# Patient Record
Sex: FEMALE | Race: WHITE | NOT HISPANIC OR LATINO | URBAN - METROPOLITAN AREA
[De-identification: names, ages, dates, MRNs, and addresses within clinical notes are randomized per-mention and may not be internally consistent; named-entity substitution may affect disease eponyms.]

---

## 2017-09-21 ENCOUNTER — APPOINTMENT (OUTPATIENT)
Dept: URBAN - METROPOLITAN AREA CLINIC 200 | Age: 57
Setting detail: DERMATOLOGY
End: 2017-09-25

## 2017-09-21 DIAGNOSIS — L57.8 OTHER SKIN CHANGES DUE TO CHRONIC EXPOSURE TO NONIONIZING RADIATION: ICD-10-CM

## 2017-09-21 DIAGNOSIS — L98.8 OTHER SPECIFIED DISORDERS OF THE SKIN AND SUBCUTANEOUS TISSUE: ICD-10-CM

## 2017-09-21 PROBLEM — L90.8 OTHER ATROPHIC DISORDERS OF SKIN: Status: ACTIVE | Noted: 2017-09-21

## 2017-09-21 PROCEDURE — OTHER COUNSELING: OTHER

## 2017-09-21 PROCEDURE — 99213 OFFICE O/P EST LOW 20 MIN: CPT

## 2017-09-21 PROCEDURE — OTHER BOTOX (U OR CC) ADDITIVE: OTHER

## 2017-09-21 PROCEDURE — OTHER BOTOX (U OR CC): OTHER

## 2017-09-21 ASSESSMENT — LOCATION ZONE DERM
LOCATION ZONE: TRUNK
LOCATION ZONE: FACE
LOCATION ZONE: FACE

## 2017-09-21 ASSESSMENT — LOCATION DETAILED DESCRIPTION DERM
LOCATION DETAILED: GLABELLA
LOCATION DETAILED: RIGHT INFERIOR MEDIAL FOREHEAD
LOCATION DETAILED: INFERIOR THORACIC SPINE
LOCATION DETAILED: LEFT FOREHEAD
LOCATION DETAILED: RIGHT INFERIOR FOREHEAD
LOCATION DETAILED: LEFT INFERIOR MEDIAL FOREHEAD

## 2017-09-21 ASSESSMENT — LOCATION SIMPLE DESCRIPTION DERM
LOCATION SIMPLE: RIGHT FOREHEAD
LOCATION SIMPLE: LEFT FOREHEAD
LOCATION SIMPLE: GLABELLA
LOCATION SIMPLE: LEFT FOREHEAD
LOCATION SIMPLE: UPPER BACK

## 2017-12-15 ENCOUNTER — APPOINTMENT (OUTPATIENT)
Dept: URBAN - METROPOLITAN AREA CLINIC 200 | Age: 57
Setting detail: DERMATOLOGY
End: 2017-12-18

## 2017-12-15 DIAGNOSIS — L98.8 OTHER SPECIFIED DISORDERS OF THE SKIN AND SUBCUTANEOUS TISSUE: ICD-10-CM

## 2017-12-15 PROBLEM — L90.8 OTHER ATROPHIC DISORDERS OF SKIN: Status: ACTIVE | Noted: 2017-12-15

## 2017-12-15 PROCEDURE — OTHER BOTOX (U OR CC) ADDITIVE: OTHER

## 2017-12-15 PROCEDURE — OTHER BOTOX (U OR CC): OTHER

## 2017-12-15 ASSESSMENT — LOCATION DETAILED DESCRIPTION DERM
LOCATION DETAILED: GLABELLA
LOCATION DETAILED: LEFT FOREHEAD
LOCATION DETAILED: LEFT INFERIOR MEDIAL FOREHEAD
LOCATION DETAILED: RIGHT INFERIOR FOREHEAD
LOCATION DETAILED: RIGHT INFERIOR MEDIAL FOREHEAD

## 2017-12-15 ASSESSMENT — LOCATION ZONE DERM
LOCATION ZONE: FACE
LOCATION ZONE: FACE

## 2017-12-15 ASSESSMENT — LOCATION SIMPLE DESCRIPTION DERM
LOCATION SIMPLE: RIGHT FOREHEAD
LOCATION SIMPLE: LEFT FOREHEAD
LOCATION SIMPLE: GLABELLA
LOCATION SIMPLE: LEFT FOREHEAD

## 2018-03-16 ENCOUNTER — RX ONLY (RX ONLY)
Age: 58
End: 2018-03-16

## 2018-03-16 ENCOUNTER — APPOINTMENT (OUTPATIENT)
Dept: URBAN - METROPOLITAN AREA CLINIC 200 | Age: 58
Setting detail: DERMATOLOGY
End: 2018-03-28

## 2018-03-16 DIAGNOSIS — L98.8 OTHER SPECIFIED DISORDERS OF THE SKIN AND SUBCUTANEOUS TISSUE: ICD-10-CM

## 2018-03-16 PROCEDURE — OTHER BOTOX (U OR CC) ADDITIVE: OTHER

## 2018-03-16 PROCEDURE — OTHER BOTOX (U OR CC): OTHER

## 2018-03-16 RX ORDER — BIMATOPROST 0.3 MG/ML
SOLUTION/ DROPS OPHTHALMIC
Qty: 1 | Refills: 3

## 2018-03-16 ASSESSMENT — LOCATION DETAILED DESCRIPTION DERM
LOCATION DETAILED: LEFT INFERIOR FOREHEAD
LOCATION DETAILED: SUPERIOR MID FOREHEAD
LOCATION DETAILED: LEFT MEDIAL FOREHEAD
LOCATION DETAILED: RIGHT INFERIOR MEDIAL FOREHEAD
LOCATION DETAILED: LEFT FOREHEAD
LOCATION DETAILED: LEFT FOREHEAD
LOCATION DETAILED: RIGHT FOREHEAD
LOCATION DETAILED: GLABELLA

## 2018-03-16 ASSESSMENT — LOCATION SIMPLE DESCRIPTION DERM
LOCATION SIMPLE: LEFT FOREHEAD
LOCATION SIMPLE: SUPERIOR FOREHEAD
LOCATION SIMPLE: LEFT FOREHEAD
LOCATION SIMPLE: RIGHT FOREHEAD
LOCATION SIMPLE: GLABELLA

## 2018-03-16 ASSESSMENT — LOCATION ZONE DERM
LOCATION ZONE: FACE
LOCATION ZONE: FACE

## 2018-06-15 ENCOUNTER — APPOINTMENT (OUTPATIENT)
Dept: URBAN - METROPOLITAN AREA CLINIC 200 | Age: 58
Setting detail: DERMATOLOGY
End: 2018-06-15

## 2018-06-15 DIAGNOSIS — L98.8 OTHER SPECIFIED DISORDERS OF THE SKIN AND SUBCUTANEOUS TISSUE: ICD-10-CM

## 2018-06-15 PROBLEM — L57.0 ACTINIC KERATOSIS: Status: ACTIVE | Noted: 2018-06-15

## 2018-06-15 PROCEDURE — OTHER BOTOX (U OR CC) ADDITIVE: OTHER

## 2018-06-15 PROCEDURE — OTHER BOTOX (U OR CC): OTHER

## 2018-06-15 ASSESSMENT — LOCATION SIMPLE DESCRIPTION DERM
LOCATION SIMPLE: LEFT FOREHEAD
LOCATION SIMPLE: GLABELLA
LOCATION SIMPLE: RIGHT FOREHEAD
LOCATION SIMPLE: SUPERIOR FOREHEAD
LOCATION SIMPLE: LEFT FOREHEAD

## 2018-06-15 ASSESSMENT — LOCATION DETAILED DESCRIPTION DERM
LOCATION DETAILED: LEFT FOREHEAD
LOCATION DETAILED: LEFT MEDIAL FOREHEAD
LOCATION DETAILED: LEFT INFERIOR FOREHEAD
LOCATION DETAILED: RIGHT FOREHEAD
LOCATION DETAILED: LEFT FOREHEAD
LOCATION DETAILED: RIGHT INFERIOR MEDIAL FOREHEAD
LOCATION DETAILED: GLABELLA
LOCATION DETAILED: SUPERIOR MID FOREHEAD

## 2018-06-15 ASSESSMENT — LOCATION ZONE DERM
LOCATION ZONE: FACE
LOCATION ZONE: FACE

## 2018-09-07 ENCOUNTER — APPOINTMENT (OUTPATIENT)
Dept: URBAN - METROPOLITAN AREA CLINIC 200 | Age: 58
Setting detail: DERMATOLOGY
End: 2018-09-11

## 2018-09-07 DIAGNOSIS — L72.0 EPIDERMAL CYST: ICD-10-CM

## 2018-09-07 DIAGNOSIS — L98.8 OTHER SPECIFIED DISORDERS OF THE SKIN AND SUBCUTANEOUS TISSUE: ICD-10-CM

## 2018-09-07 DIAGNOSIS — L57.8 OTHER SKIN CHANGES DUE TO CHRONIC EXPOSURE TO NONIONIZING RADIATION: ICD-10-CM

## 2018-09-07 DIAGNOSIS — L82.1 OTHER SEBORRHEIC KERATOSIS: ICD-10-CM

## 2018-09-07 PROBLEM — L55.1 SUNBURN OF SECOND DEGREE: Status: ACTIVE | Noted: 2018-09-07

## 2018-09-07 PROCEDURE — OTHER BENIGN DESTRUCTION COSMETIC: OTHER

## 2018-09-07 PROCEDURE — OTHER BOTOX (U OR CC): OTHER

## 2018-09-07 PROCEDURE — OTHER COUNSELING: OTHER

## 2018-09-07 PROCEDURE — OTHER BOTOX (U OR CC) ADDITIVE: OTHER

## 2018-09-07 PROCEDURE — OTHER LIQUID NITROGEN (COSMETIC): OTHER

## 2018-09-07 PROCEDURE — 99213 OFFICE O/P EST LOW 20 MIN: CPT

## 2018-09-07 ASSESSMENT — LOCATION ZONE DERM
LOCATION ZONE: FACE
LOCATION ZONE: FACE
LOCATION ZONE: TRUNK
LOCATION ZONE: TRUNK

## 2018-09-07 ASSESSMENT — LOCATION DETAILED DESCRIPTION DERM
LOCATION DETAILED: LEFT FOREHEAD
LOCATION DETAILED: SUBXIPHOID
LOCATION DETAILED: LEFT MEDIAL SUPERIOR CHEST
LOCATION DETAILED: RIGHT INFERIOR MEDIAL FOREHEAD
LOCATION DETAILED: LEFT MEDIAL FOREHEAD
LOCATION DETAILED: SUPERIOR MID FOREHEAD
LOCATION DETAILED: LEFT FOREHEAD
LOCATION DETAILED: GLABELLA
LOCATION DETAILED: LEFT SUPERIOR LATERAL FOREHEAD
LOCATION DETAILED: UPPER STERNUM
LOCATION DETAILED: RIGHT SUPERIOR FOREHEAD
LOCATION DETAILED: RIGHT FOREHEAD
LOCATION DETAILED: LEFT INFERIOR FOREHEAD

## 2018-09-07 ASSESSMENT — LOCATION SIMPLE DESCRIPTION DERM
LOCATION SIMPLE: SUPERIOR FOREHEAD
LOCATION SIMPLE: LEFT FOREHEAD
LOCATION SIMPLE: CHEST
LOCATION SIMPLE: ABDOMEN
LOCATION SIMPLE: CHEST
LOCATION SIMPLE: LEFT FOREHEAD
LOCATION SIMPLE: RIGHT FOREHEAD
LOCATION SIMPLE: GLABELLA

## 2018-09-07 NOTE — PROCEDURE: LIQUID NITROGEN (COSMETIC)
Detail Level: Generalized
Render Post-Care Instructions In Note?: no
Post-Care Instructions: I reviewed with the patient in detail post-care instructions. Patient is to wear sunprotection, and avoid picking at any of the treated lesions. Pt may apply Vaseline to crusted or scabbing areas.
Price (Use Numbers Only, No Special Characters Or $): 0
Consent: The patient's consent was obtained including but not limited to risks of crusting, scabbing, blistering, scarring, darker or lighter pigmentary change, recurrence, incomplete removal and infection. The patient understands that the procedure is cosmetic in nature and is not covered by insurance.

## 2018-09-07 NOTE — PROCEDURE: BENIGN DESTRUCTION COSMETIC
Detail Level: Zone
Anesthesia Type: 2% lidocaine without epinephrine
Consent: The patient's consent was obtained including but not limited to risks of crusting, scabbing, blistering, scarring, darker or lighter pigmentary change, recurrence, incomplete removal and infection.
Price (Use Numbers Only, No Special Characters Or $): 0
Post-Care Instructions: I reviewed with the patient in detail post-care instructions. Patient is to wear sunprotection, and avoid picking at any of the treated lesions. Pt may apply Vaseline to crusted or scabbing areas.

## 2018-09-20 ENCOUNTER — RX ONLY (RX ONLY)
Age: 58
End: 2018-09-20

## 2018-09-20 RX ORDER — BIMATOPROST 0.3 MG/ML
SOLUTION/ DROPS OPHTHALMIC
Qty: 2 | Refills: 3 | Status: ERX

## 2018-11-12 ENCOUNTER — HOSPITAL ENCOUNTER (OUTPATIENT)
Facility: CLINIC | Age: 58
Discharge: HOME | End: 2018-11-12
Attending: INTERNAL MEDICINE
Payer: COMMERCIAL

## 2018-11-12 VITALS
DIASTOLIC BLOOD PRESSURE: 78 MMHG | HEIGHT: 65 IN | RESPIRATION RATE: 16 BRPM | SYSTOLIC BLOOD PRESSURE: 136 MMHG | WEIGHT: 160 LBS | HEART RATE: 71 BPM | TEMPERATURE: 97.8 F | BODY MASS INDEX: 26.66 KG/M2 | OXYGEN SATURATION: 94 %
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
## 2018-11-12 PROCEDURE — S9083 URGENT CARE CENTER GLOBAL: HCPCS | Performed by: INTERNAL MEDICINE

## 2018-11-12 PROCEDURE — 99202 OFFICE O/P NEW SF 15 MIN: CPT | Performed by: INTERNAL MEDICINE

## 2018-11-12 RX ORDER — PREDNISONE 20 MG/1
TABLET ORAL
Refills: 0 | COMMUNITY
Start: 2018-10-29 | End: 2019-02-04 | Stop reason: ALTCHOICE

## 2018-11-12 RX ORDER — HYDROCHLOROTHIAZIDE 12.5 MG/1
12.5 CAPSULE ORAL
Refills: 1 | COMMUNITY
Start: 2018-08-08 | End: 2019-02-04 | Stop reason: ALTCHOICE

## 2018-11-12 RX ORDER — BIMATOPROST 3 UG/ML
SOLUTION TOPICAL
Refills: 3 | COMMUNITY
Start: 2018-10-06

## 2018-11-12 RX ORDER — AMOXICILLIN AND CLAVULANATE POTASSIUM 875; 125 MG/1; MG/1
1 TABLET, FILM COATED ORAL 2 TIMES DAILY
Qty: 16 TABLET | Refills: 0 | Status: SHIPPED | OUTPATIENT
Start: 2018-11-12 | End: 2019-02-04 | Stop reason: ALTCHOICE

## 2018-11-12 RX ORDER — ESCITALOPRAM OXALATE 5 MG/1
TABLET ORAL
Refills: 1 | COMMUNITY
Start: 2018-10-29 | End: 2019-05-22 | Stop reason: SDUPTHER

## 2018-11-12 RX ORDER — LISINOPRIL AND HYDROCHLOROTHIAZIDE 10; 12.5 MG/1; MG/1
1 TABLET ORAL
Refills: 1 | COMMUNITY
Start: 2018-10-29 | End: 2019-02-04 | Stop reason: DRUGHIGH

## 2018-11-12 ASSESSMENT — ENCOUNTER SYMPTOMS
TROUBLE SWALLOWING: 0
VOMITING: 0
WOUND: 1
SHORTNESS OF BREATH: 0
DIZZINESS: 0
FEVER: 0
NAUSEA: 0

## 2018-11-12 NOTE — ED PROVIDER NOTES
History  No chief complaint on file.    Bitten by neighbor's dog  Dog UTD per patient    Allergies; sulfa        History provided by:  Patient   used: No    Animal Bite   Contact animal:  Dog  Location:  Leg  Leg injury location:  L lower leg  Time since incident:  30 minutes  Pain details:     Quality:  Aching    Severity:  Moderate  Provoked: unprovoked    Notifications:  None  Animal in possession: yes    Tetanus status:  Up to date  Relieved by:  None tried  Associated symptoms: swelling    Associated symptoms: no fever        No past medical history on file.    No past surgical history on file.    No family history on file.    Social History   Substance Use Topics   • Smoking status: Not on file   • Smokeless tobacco: Not on file   • Alcohol use Not on file       Review of Systems   Constitutional: Negative for fever.   HENT: Negative for trouble swallowing.    Respiratory: Negative for shortness of breath.    Cardiovascular: Negative for chest pain.   Gastrointestinal: Negative for nausea and vomiting.   Skin: Positive for wound.   Neurological: Negative for dizziness.       Physical Exam  ED Triage Vitals   Temp Pulse Resp BP SpO2   -- -- -- -- --      Temp src Heart Rate Source Patient Position BP Location FiO2 (%) (Set)   -- -- -- -- --       Physical Exam   Constitutional: She appears well-nourished. No distress.   Cardiovascular: Normal rate and regular rhythm.    Pulmonary/Chest: Effort normal and breath sounds normal. No respiratory distress.   Skin: Skin is warm and dry.   There is a 1 cm laceration over the lateral aspect of the left thigh.  The area is tender to palpation.  No induration or fluctuance.  Bleeding is controlled.  There is some surrounding superficial erythema.  Additionally there is some appearance of some early bruising inferior and lateral to the laceration/bite wound.   Psychiatric: She has a normal mood and affect. Her behavior is normal.          Procedures  Procedures    UC Course  Clinical Impressions as of Nov 12 1633   Dog bite, initial encounter       MDM  Number of Diagnoses or Management Options  Dog bite, initial encounter:   Diagnosis management comments: Patient presents with a painful dog bite to the left thigh.  Happened just a little while ago.  The area is somewhat red around the wound.  Patient will be started with antibiotics in the form of Augmentin.  She has a sulfa allergy.  The dog is up-to-date on its rabies vaccine and the patient is up-to-date on her tetanus.  Anti-inflammatories as needed.  Local wound care was provided in the office with a dressing.                 Dileep Alston DO  11/12/18 [id number]

## 2018-11-12 NOTE — DISCHARGE INSTRUCTIONS
In the office the wound was irrigated with approximately 200 mL's of our wound care spray.  You did great.  The wound was covered with Telfa and a clean dressing.  I would not use any antibiotic salves or creams to the area which can keep it covered.  It may drain a little bit today from all the fluid we irrigated into the wound.  Additionally Motrin or Aleve, not both, for pain as needed and directed.  Please start the antibiotics and take as directed twice daily.  If the area gets worse especially after 24-48 hours you start developing high fever worsening swelling or other concerns please go to the nearest ER for further evaluation and treatment.

## 2018-12-07 ENCOUNTER — APPOINTMENT (OUTPATIENT)
Dept: URBAN - METROPOLITAN AREA CLINIC 200 | Age: 58
Setting detail: DERMATOLOGY
End: 2018-12-17

## 2018-12-07 DIAGNOSIS — L57.8 OTHER SKIN CHANGES DUE TO CHRONIC EXPOSURE TO NONIONIZING RADIATION: ICD-10-CM

## 2018-12-07 DIAGNOSIS — L98.8 OTHER SPECIFIED DISORDERS OF THE SKIN AND SUBCUTANEOUS TISSUE: ICD-10-CM

## 2018-12-07 PROBLEM — L55.1 SUNBURN OF SECOND DEGREE: Status: ACTIVE | Noted: 2018-12-07

## 2018-12-07 PROCEDURE — OTHER BOTOX (U OR CC) ADDITIVE: OTHER

## 2018-12-07 PROCEDURE — OTHER BOTOX (U OR CC): OTHER

## 2018-12-07 ASSESSMENT — LOCATION DETAILED DESCRIPTION DERM
LOCATION DETAILED: RIGHT INFERIOR MEDIAL FOREHEAD
LOCATION DETAILED: LEFT MEDIAL FOREHEAD
LOCATION DETAILED: GLABELLA
LOCATION DETAILED: LEFT SUPERIOR LATERAL FOREHEAD
LOCATION DETAILED: SUPERIOR MID FOREHEAD
LOCATION DETAILED: RIGHT FOREHEAD
LOCATION DETAILED: RIGHT SUPERIOR FOREHEAD
LOCATION DETAILED: LEFT FOREHEAD
LOCATION DETAILED: LEFT INFERIOR FOREHEAD

## 2018-12-07 ASSESSMENT — LOCATION ZONE DERM: LOCATION ZONE: FACE

## 2018-12-07 ASSESSMENT — LOCATION SIMPLE DESCRIPTION DERM
LOCATION SIMPLE: RIGHT FOREHEAD
LOCATION SIMPLE: SUPERIOR FOREHEAD
LOCATION SIMPLE: GLABELLA
LOCATION SIMPLE: LEFT FOREHEAD

## 2019-02-04 ENCOUNTER — OFFICE VISIT (OUTPATIENT)
Dept: PRIMARY CARE | Facility: CLINIC | Age: 59
End: 2019-02-04
Payer: COMMERCIAL

## 2019-02-04 VITALS
SYSTOLIC BLOOD PRESSURE: 168 MMHG | OXYGEN SATURATION: 98 % | DIASTOLIC BLOOD PRESSURE: 97 MMHG | BODY MASS INDEX: 26.82 KG/M2 | RESPIRATION RATE: 14 BRPM | HEART RATE: 71 BPM | TEMPERATURE: 98.5 F | WEIGHT: 161 LBS | HEIGHT: 65 IN

## 2019-02-04 DIAGNOSIS — J01.00 ACUTE NON-RECURRENT MAXILLARY SINUSITIS: ICD-10-CM

## 2019-02-04 DIAGNOSIS — I10 ESSENTIAL HYPERTENSION: Primary | ICD-10-CM

## 2019-02-04 PROBLEM — F33.41 RECURRENT MAJOR DEPRESSIVE DISORDER, IN PARTIAL REMISSION (CMS/HCC): Chronic | Status: ACTIVE | Noted: 2019-02-04

## 2019-02-04 PROBLEM — F33.41 RECURRENT MAJOR DEPRESSIVE DISORDER, IN PARTIAL REMISSION (CMS/HCC): Status: ACTIVE | Noted: 2019-02-04

## 2019-02-04 PROCEDURE — 99214 OFFICE O/P EST MOD 30 MIN: CPT | Performed by: FAMILY MEDICINE

## 2019-02-04 RX ORDER — CETIRIZINE HYDROCHLORIDE 10 MG/1
10 TABLET ORAL DAILY
COMMUNITY
End: 2021-12-13 | Stop reason: ALTCHOICE

## 2019-02-04 RX ORDER — VALACYCLOVIR HYDROCHLORIDE 1 G/1
TABLET, FILM COATED ORAL
Refills: 0 | COMMUNITY
Start: 2018-11-28 | End: 2019-09-05 | Stop reason: ALTCHOICE

## 2019-02-04 RX ORDER — LISINOPRIL AND HYDROCHLOROTHIAZIDE 20; 25 MG/1; MG/1
1 TABLET ORAL DAILY
Qty: 90 TABLET | Refills: 1 | COMMUNITY
Start: 2019-02-04 | End: 2019-03-18 | Stop reason: SDUPTHER

## 2019-02-04 RX ORDER — SENNOSIDES 8.6 MG/1
8.6 TABLET ORAL
COMMUNITY
Start: 2017-05-31 | End: 2019-09-05 | Stop reason: ALTCHOICE

## 2019-02-04 RX ORDER — OXYCODONE AND ACETAMINOPHEN 5; 325 MG/1; MG/1
1-2 TABLET ORAL
COMMUNITY
Start: 2017-05-31 | End: 2019-02-04 | Stop reason: ALTCHOICE

## 2019-02-04 RX ORDER — DIAZEPAM 5 MG/1
5 TABLET ORAL EVERY 8 HOURS PRN
COMMUNITY
Start: 2017-05-31 | End: 2019-09-05 | Stop reason: ALTCHOICE

## 2019-02-04 RX ORDER — AZITHROMYCIN 250 MG/1
TABLET, FILM COATED ORAL
Qty: 6 TABLET | Refills: 0 | Status: SHIPPED | OUTPATIENT
Start: 2019-02-04 | End: 2019-02-09

## 2019-02-04 RX ORDER — FLUTICASONE PROPIONATE 50 MCG
2 SPRAY, SUSPENSION (ML) NASAL DAILY
Qty: 1 BOTTLE | Refills: 5 | Status: SHIPPED | OUTPATIENT
Start: 2019-02-04 | End: 2019-09-05 | Stop reason: ALTCHOICE

## 2019-02-04 RX ORDER — AMLODIPINE BESYLATE 5 MG/1
5 TABLET ORAL DAILY
COMMUNITY
End: 2019-02-04 | Stop reason: ALTCHOICE

## 2019-02-04 RX ORDER — POLYETHYLENE GLYCOL 3350 17 G/17G
17 POWDER, FOR SOLUTION ORAL
COMMUNITY
Start: 2017-05-31 | End: 2019-09-05 | Stop reason: ALTCHOICE

## 2019-02-04 ASSESSMENT — ENCOUNTER SYMPTOMS
CHEST TIGHTNESS: 0
WHEEZING: 0
CHILLS: 1
ACTIVITY CHANGE: 1
SINUS PRESSURE: 1
SORE THROAT: 1
SHORTNESS OF BREATH: 0
COUGH: 1
FATIGUE: 1
DIARRHEA: 1
DIAPHORESIS: 1
UNEXPECTED WEIGHT CHANGE: 0
APPETITE CHANGE: 1
PALPITATIONS: 0
SINUS PAIN: 1

## 2019-02-04 NOTE — PROGRESS NOTES
"Subjective      Patient ID: Francine Tuttle is a 58 y.o. female.  1960      Sinus pressure, nasal congestion, sore throat, cough, fever x 6 days  Boss,  sick= with same. Using nyquil and dayquil x 1 weeks without sig relief.    HTN ccb caused ankle edema when travelling. Now taking lisinopril HCTZ. Denies cp, palp, sob, edema.       Cough   Associated symptoms include chills and a sore throat. Pertinent negatives include no chest pain, shortness of breath or wheezing.   Diarrhea    Associated symptoms include chills and coughing.       The following have been reviewed and updated as appropriate in this visit:  Tobacco  Allergies  Meds  Problems  Med Hx  Surg Hx  Fam Hx  Soc Hx        Review of Systems   Constitutional: Positive for activity change, appetite change, chills, diaphoresis and fatigue. Negative for unexpected weight change.   HENT: Positive for congestion, sinus pain, sinus pressure and sore throat. Negative for hearing loss.    Eyes: Negative for visual disturbance.   Respiratory: Positive for cough. Negative for chest tightness, shortness of breath and wheezing.    Cardiovascular: Negative for chest pain, palpitations and leg swelling.   Gastrointestinal: Positive for diarrhea.       Objective     Vitals:    02/04/19 0911   BP: (!) 168/97   BP Location: Right upper arm   Patient Position: Sitting   Pulse: 71   Resp: 14   Temp: 36.9 °C (98.5 °F)   TempSrc: Oral   SpO2: 98%   Weight: 73 kg (161 lb)   Height: 1.638 m (5' 4.5\")     Body mass index is 27.21 kg/m².    Physical Exam   Constitutional: She is oriented to person, place, and time. She appears well-developed and well-nourished. No distress.   HENT:   Head: Normocephalic and atraumatic.   Right Ear: External ear normal.   Left Ear: External ear normal.   Nose: Nose normal.   Mouth/Throat: Oropharynx is clear and moist. No oropharyngeal exudate.   Eyes: Conjunctivae and EOM are normal. Right eye exhibits no discharge. Left " eye exhibits no discharge. No scleral icterus.   Neck: Neck supple. No JVD present. No thyromegaly present.   Cardiovascular: Normal rate, regular rhythm and normal heart sounds.    No murmur heard.  Pulmonary/Chest: Effort normal and breath sounds normal. No respiratory distress. She has no wheezes. She has no rales.   Musculoskeletal: She exhibits no edema.   Lymphadenopathy:     She has no cervical adenopathy.   Neurological: She is alert and oriented to person, place, and time.   Skin: She is not diaphoretic.   Psychiatric: She has a normal mood and affect. Her behavior is normal. Judgment and thought content normal.   Nursing note and vitals reviewed.      Assessment/Plan   Problem List Items Addressed This Visit     Essential hypertension - Primary (Chronic)     Elevated  Avoid decongestants  Titrate bp med  F/u 4 weeks for bp, bmp         Acute non-recurrent maxillary sinusitis     Start zpack Rx, flonase Rx  Rest, fluids  Call if sxs persist or worsen over next 72 hours.

## 2019-02-13 PROBLEM — D05.10 DCIS (DUCTAL CARCINOMA IN SITU): Status: ACTIVE | Noted: 2019-02-13

## 2019-02-13 PROBLEM — J01.00 ACUTE NON-RECURRENT MAXILLARY SINUSITIS: Status: RESOLVED | Noted: 2019-02-04 | Resolved: 2019-02-13

## 2019-02-13 PROBLEM — G51.39 HEMIFACIAL SPASM: Status: ACTIVE | Noted: 2019-02-13

## 2019-03-04 ENCOUNTER — OFFICE VISIT (OUTPATIENT)
Dept: PRIMARY CARE | Facility: CLINIC | Age: 59
End: 2019-03-04
Payer: COMMERCIAL

## 2019-03-04 VITALS
RESPIRATION RATE: 18 BRPM | HEIGHT: 65 IN | OXYGEN SATURATION: 96 % | TEMPERATURE: 98 F | HEART RATE: 80 BPM | DIASTOLIC BLOOD PRESSURE: 70 MMHG | SYSTOLIC BLOOD PRESSURE: 116 MMHG | BODY MASS INDEX: 26.82 KG/M2 | WEIGHT: 161 LBS

## 2019-03-04 DIAGNOSIS — I10 ESSENTIAL HYPERTENSION: Primary | ICD-10-CM

## 2019-03-04 LAB
ANION GAP SERPL CALC-SCNC: 10 MEQ/L (ref 3–15)
BUN SERPL-MCNC: 14 MG/DL (ref 8–20)
CALCIUM SERPL-MCNC: 9.6 MG/DL (ref 8.9–10.3)
CHLORIDE SERPL-SCNC: 101 MEQ/L (ref 98–109)
CO2 SERPL-SCNC: 28 MEQ/L (ref 22–32)
CREAT SERPL-MCNC: 0.6 MG/DL
GFR SERPL CREATININE-BSD FRML MDRD: >60 ML/MIN/1.73M*2
GLUCOSE SERPL-MCNC: 56 MG/DL (ref 70–99)
POTASSIUM SERPL-SCNC: 3.6 MEQ/L (ref 3.6–5.1)
SODIUM SERPL-SCNC: 139 MEQ/L (ref 136–144)

## 2019-03-04 PROCEDURE — 99213 OFFICE O/P EST LOW 20 MIN: CPT | Performed by: FAMILY MEDICINE

## 2019-03-04 PROCEDURE — 80048 BASIC METABOLIC PNL TOTAL CA: CPT | Performed by: FAMILY MEDICINE

## 2019-03-04 ASSESSMENT — ENCOUNTER SYMPTOMS
COUGH: 0
ACTIVITY CHANGE: 0
APPETITE CHANGE: 0
PALPITATIONS: 0
SHORTNESS OF BREATH: 0
FATIGUE: 0
CHEST TIGHTNESS: 0
WHEEZING: 0

## 2019-03-04 NOTE — ASSESSMENT & PLAN NOTE
Now normotensive after titration of lisinopril HCTZ  Repeat bmp today  Cont current therapyl resee 6 mos.

## 2019-03-18 RX ORDER — LISINOPRIL AND HYDROCHLOROTHIAZIDE 20; 25 MG/1; MG/1
1 TABLET ORAL DAILY
Qty: 90 TABLET | Refills: 3 | Status: SHIPPED | OUTPATIENT
Start: 2019-03-18 | End: 2019-03-29 | Stop reason: SDUPTHER

## 2019-03-18 NOTE — TELEPHONE ENCOUNTER
From: Francine Tuttle  Sent: 3/18/2019 5:51 PM EDT  Subject: Medication Renewal Request    Francine Tuttle would like a refill of the following medications:     lisinopril-hydrochlorothiazide (PRINZIDE,ZESTORETIC) 20-25 mg per tablet   Patient Comment: My pharmacy doesn't have this RX yet. Please call it in to the [facility] on [address] in [facility]    Preferred pharmacy: [id number] - [place], [place] - [address] AT [facility]  Delivery method: Pickup  Preferred pick-up date and time: 3/19/2019 5:00 PM

## 2019-03-29 RX ORDER — LISINOPRIL AND HYDROCHLOROTHIAZIDE 20; 25 MG/1; MG/1
1 TABLET ORAL DAILY
Qty: 5 TABLET | Refills: 0 | Status: SHIPPED | OUTPATIENT
Start: 2019-03-29 | End: 2020-03-03

## 2019-03-29 NOTE — TELEPHONE ENCOUNTER
Dr. Auguste - Patient is away in [place] and forgot her BP medication. She is requesting 5 pills be sent to a local pharmacy in [place]. I set this refill up for qty 5 and to be sent to the pharmacy she specified.

## 2019-05-22 ENCOUNTER — OFFICE VISIT (OUTPATIENT)
Dept: PRIMARY CARE | Facility: CLINIC | Age: 59
End: 2019-05-22
Payer: COMMERCIAL

## 2019-05-22 VITALS
TEMPERATURE: 98.4 F | OXYGEN SATURATION: 97 % | DIASTOLIC BLOOD PRESSURE: 78 MMHG | WEIGHT: 160 LBS | HEART RATE: 76 BPM | BODY MASS INDEX: 26.66 KG/M2 | HEIGHT: 65 IN | SYSTOLIC BLOOD PRESSURE: 114 MMHG | RESPIRATION RATE: 12 BRPM

## 2019-05-22 DIAGNOSIS — F33.41 RECURRENT MAJOR DEPRESSIVE DISORDER, IN PARTIAL REMISSION (CMS/HCC): Primary | Chronic | ICD-10-CM

## 2019-05-22 DIAGNOSIS — I10 ESSENTIAL HYPERTENSION: Chronic | ICD-10-CM

## 2019-05-22 DIAGNOSIS — J01.00 ACUTE NON-RECURRENT MAXILLARY SINUSITIS: ICD-10-CM

## 2019-05-22 PROBLEM — J20.8 ACUTE BRONCHITIS DUE TO OTHER SPECIFIED ORGANISMS: Status: RESOLVED | Noted: 2019-05-22 | Resolved: 2019-05-22

## 2019-05-22 PROBLEM — J20.8 ACUTE BRONCHITIS DUE TO OTHER SPECIFIED ORGANISMS: Status: ACTIVE | Noted: 2019-05-22

## 2019-05-22 PROCEDURE — 99214 OFFICE O/P EST MOD 30 MIN: CPT | Performed by: FAMILY MEDICINE

## 2019-05-22 RX ORDER — ESCITALOPRAM OXALATE 5 MG/1
5 TABLET ORAL DAILY
Qty: 90 TABLET | Refills: 3 | Status: SHIPPED | OUTPATIENT
Start: 2019-05-22 | End: 2021-02-13

## 2019-05-22 RX ORDER — AZITHROMYCIN 250 MG/1
TABLET, FILM COATED ORAL
Qty: 6 TABLET | Refills: 0 | Status: SHIPPED | OUTPATIENT
Start: 2019-05-22 | End: 2019-05-27

## 2019-05-22 RX ORDER — ALBUTEROL SULFATE 90 UG/1
2 INHALANT RESPIRATORY (INHALATION) EVERY 4 HOURS PRN
Qty: 1 INHALER | Refills: 0 | Status: SHIPPED | OUTPATIENT
Start: 2019-05-22 | End: 2020-11-10

## 2019-05-22 ASSESSMENT — ENCOUNTER SYMPTOMS
CHILLS: 0
PALPITATIONS: 0
SORE THROAT: 1
CHEST TIGHTNESS: 0
FEVER: 0
FATIGUE: 1
DIAPHORESIS: 0
ACTIVITY CHANGE: 0
SINUS PAIN: 0
COUGH: 1
SINUS PRESSURE: 0
TROUBLE SWALLOWING: 0
WHEEZING: 0
APPETITE CHANGE: 0
SHORTNESS OF BREATH: 0

## 2019-05-22 ASSESSMENT — PAIN SCALES - GENERAL: PAINLEVEL: 5

## 2019-05-22 NOTE — PROGRESS NOTES
"Subjective      Patient ID: Francine Tuttle is a 58 y.o. female.  1960      Travelled to [place] and everyone was sick with uri sxs but she can't seem to shake this  Cough, sore throat  pnd  No ear or sinus pain  No sob but gargling sounds in chest, wheeze      Cough   Associated symptoms include postnasal drip and a sore throat. Pertinent negatives include no chest pain, chills, fever, shortness of breath or wheezing.   Sore Throat    Associated symptoms include congestion and coughing. Pertinent negatives include no shortness of breath or trouble swallowing.       The following have been reviewed and updated as appropriate in this visit:  Tobacco  Allergies  Meds  Med Hx  Surg Hx  Fam Hx  Soc Hx      Review of Systems   Constitutional: Positive for fatigue. Negative for activity change, appetite change, chills, diaphoresis and fever.   HENT: Positive for congestion, postnasal drip and sore throat. Negative for sinus pain, sinus pressure and trouble swallowing.    Respiratory: Positive for cough. Negative for chest tightness, shortness of breath and wheezing.    Cardiovascular: Negative for chest pain, palpitations and leg swelling.       Objective     Vitals:    05/22/19 1510   BP: 114/78   Pulse: 76   Resp: 12   Temp: 36.9 °C (98.4 °F)   TempSrc: Oral   SpO2: 97%   Weight: 72.6 kg (160 lb)   Height: 1.638 m (5' 4.5\")     Body mass index is 27.04 kg/m².    Physical Exam   Constitutional: She is oriented to person, place, and time. She appears well-developed and well-nourished. No distress.   HENT:   Head: Normocephalic and atraumatic.   Right Ear: External ear normal.   Left Ear: External ear normal.   Nose: Nose normal.   Mouth/Throat: Oropharynx is clear and moist. No oropharyngeal exudate.   Eyes: Conjunctivae and EOM are normal. Right eye exhibits no discharge. Left eye exhibits no discharge. No scleral icterus.   Neck: Neck supple. No JVD present. No thyromegaly present.   Cardiovascular: " Normal rate, regular rhythm and normal heart sounds.    No murmur heard.  Pulmonary/Chest: Effort normal and breath sounds normal. No respiratory distress. She has no wheezes. She has no rales.   Lymphadenopathy:     She has no cervical adenopathy.   Neurological: She is alert and oriented to person, place, and time.   Skin: Skin is warm and dry. No rash noted. She is not diaphoretic. No erythema.   Psychiatric: She has a normal mood and affect. Her behavior is normal. Judgment and thought content normal.   Nursing note and vitals reviewed.      Assessment/Plan   Diagnoses and all orders for this visit:    Recurrent major depressive disorder, in partial remission (CMS/[facility]) (Primary)  Assessment & Plan:  Doing well on lexapro low dose  Twice wekly.  May retire soon.    Orders:  -     escitalopram (LEXAPRO) 5 mg tablet; Take 1 tablet (5 mg total) by mouth daily.    Acute non-recurrent maxillary sinusitis  Assessment & Plan:  zpack Rx  flonase RX  Rest, fluids      Essential hypertension  Assessment & Plan:  Normotensive  Well controlled  resee 6 mos      Other orders  -     azithromycin (ZITHROMAX) 250 mg tablet; Take 2 tablets the first day, then 1 tablet daily for 4 days.  -     albuterol HFA (VENTOLIN HFA) 90 mcg/actuation inhaler; Inhale 2 puffs every 4 (four) hours as needed for shortness of breath.

## 2019-05-28 ENCOUNTER — APPOINTMENT (OUTPATIENT)
Dept: URBAN - METROPOLITAN AREA CLINIC 200 | Age: 59
Setting detail: DERMATOLOGY
End: 2019-06-11

## 2019-05-28 DIAGNOSIS — L98.8 OTHER SPECIFIED DISORDERS OF THE SKIN AND SUBCUTANEOUS TISSUE: ICD-10-CM

## 2019-05-28 PROBLEM — L55.1 SUNBURN OF SECOND DEGREE: Status: ACTIVE | Noted: 2019-05-28

## 2019-05-28 PROCEDURE — OTHER BOTOX (U OR CC): OTHER

## 2019-05-28 PROCEDURE — OTHER BOTOX (U OR CC) ADDITIVE: OTHER

## 2019-05-28 ASSESSMENT — LOCATION SIMPLE DESCRIPTION DERM
LOCATION SIMPLE: SUPERIOR FOREHEAD
LOCATION SIMPLE: RIGHT FOREHEAD
LOCATION SIMPLE: GLABELLA
LOCATION SIMPLE: LEFT FOREHEAD

## 2019-05-28 ASSESSMENT — LOCATION DETAILED DESCRIPTION DERM
LOCATION DETAILED: RIGHT FOREHEAD
LOCATION DETAILED: LEFT MEDIAL FOREHEAD
LOCATION DETAILED: RIGHT SUPERIOR FOREHEAD
LOCATION DETAILED: SUPERIOR MID FOREHEAD
LOCATION DETAILED: LEFT INFERIOR FOREHEAD
LOCATION DETAILED: LEFT SUPERIOR LATERAL FOREHEAD
LOCATION DETAILED: RIGHT INFERIOR MEDIAL FOREHEAD
LOCATION DETAILED: LEFT FOREHEAD
LOCATION DETAILED: GLABELLA

## 2019-05-28 ASSESSMENT — LOCATION ZONE DERM: LOCATION ZONE: FACE

## 2019-09-05 ENCOUNTER — OFFICE VISIT (OUTPATIENT)
Dept: PRIMARY CARE | Facility: CLINIC | Age: 59
End: 2019-09-05
Payer: COMMERCIAL

## 2019-09-05 VITALS
OXYGEN SATURATION: 96 % | DIASTOLIC BLOOD PRESSURE: 83 MMHG | WEIGHT: 164 LBS | HEIGHT: 65 IN | RESPIRATION RATE: 14 BRPM | TEMPERATURE: 98 F | HEART RATE: 71 BPM | SYSTOLIC BLOOD PRESSURE: 122 MMHG | BODY MASS INDEX: 27.32 KG/M2

## 2019-09-05 DIAGNOSIS — I10 ESSENTIAL HYPERTENSION: Primary | Chronic | ICD-10-CM

## 2019-09-05 DIAGNOSIS — F33.41 RECURRENT MAJOR DEPRESSIVE DISORDER, IN PARTIAL REMISSION (CMS/HCC): Chronic | ICD-10-CM

## 2019-09-05 DIAGNOSIS — R06.09 DYSPNEA ON EXERTION: ICD-10-CM

## 2019-09-05 PROBLEM — G51.39 HEMIFACIAL SPASM: Status: RESOLVED | Noted: 2019-02-13 | Resolved: 2019-09-05

## 2019-09-05 PROBLEM — J01.00 ACUTE NON-RECURRENT MAXILLARY SINUSITIS: Status: RESOLVED | Noted: 2019-02-04 | Resolved: 2019-09-05

## 2019-09-05 PROCEDURE — 99214 OFFICE O/P EST MOD 30 MIN: CPT | Performed by: FAMILY MEDICINE

## 2019-09-05 ASSESSMENT — ENCOUNTER SYMPTOMS
APPETITE CHANGE: 0
CHILLS: 0
FATIGUE: 0
WHEEZING: 0
NUMBNESS: 0
SEIZURES: 0
DIZZINESS: 1
PALPITATIONS: 0
ACTIVITY CHANGE: 1
SPEECH DIFFICULTY: 0
SHORTNESS OF BREATH: 1
DIAPHORESIS: 0
FEVER: 0
LIGHT-HEADEDNESS: 1
CHEST TIGHTNESS: 0
COUGH: 0

## 2019-09-05 NOTE — PROGRESS NOTES
"Subjective      Patient ID: Francine Tuttle is a 59 y.o. female.  1960      No recent exercise lately until this past weekend when she went bike riding with three men and found herself easily sob, dizzy and lightheaded. No syncope. No cp or chest tightness. No ankle edema.  Wants to advance her exercise regiment but wants to be safe about it. Fathe rwith h/o CAD        The following have been reviewed and updated as appropriate in this visit:  Tobacco  Allergies  Meds  Problems  Med Hx  Surg Hx  Fam Hx  Soc Hx        Review of Systems   Constitutional: Positive for activity change. Negative for appetite change, chills, diaphoresis, fatigue and fever.   Respiratory: Positive for shortness of breath. Negative for cough, chest tightness and wheezing.    Cardiovascular: Negative for chest pain, palpitations and leg swelling.   Neurological: Positive for dizziness and light-headedness. Negative for seizures, syncope, speech difficulty and numbness.       Objective     Vitals:    09/05/19 1250   BP: 122/83   BP Location: Right upper arm   Patient Position: Sitting   Pulse: 71   Resp: 14   Temp: 36.7 °C (98 °F)   TempSrc: Oral   SpO2: 96%   Weight: 74.4 kg (164 lb)   Height: 1.638 m (5' 4.5\")     Body mass index is 27.72 kg/m².    Physical Exam   Constitutional: She is oriented to person, place, and time. She appears well-developed and well-nourished. No distress.   HENT:   Head: Normocephalic and atraumatic.   Eyes: Conjunctivae and EOM are normal. Right eye exhibits no discharge. Left eye exhibits no discharge. No scleral icterus.   Neck: Neck supple. No JVD present. No thyromegaly present.   Cardiovascular: Normal rate, regular rhythm and normal heart sounds.    No murmur heard.  Pulmonary/Chest: Effort normal and breath sounds normal. No respiratory distress. She has no wheezes. She has no rales.   Lymphadenopathy:     She has no cervical adenopathy.   Neurological: She is alert and oriented to " person, place, and time.   Skin: Skin is warm and dry. No rash noted. She is not diaphoretic. No erythema. No pallor.   Psychiatric: She has a normal mood and affect. Her behavior is normal. Judgment and thought content normal.   Nursing note and vitals reviewed.      Assessment/Plan   Diagnoses and all orders for this visit:    Essential hypertension (Primary)  Assessment & Plan:  Normotensive  Check fast labs  resee 6 mos.      Dyspnea on exertion  Assessment & Plan:  New  vss  Check labs r/o anemia  Refer to cards for stress test  Suspect deconditioning but will r/o   resee 6 mos    Orders:  -     Lipid panel; Future  -     CBC and differential; Future  -     Comprehensive metabolic panel; Future  -     TSH; Future    Recurrent major depressive disorder, in partial remission (CMS/HCC)  Assessment & Plan:  doin well on lexapro

## 2019-09-05 NOTE — ASSESSMENT & PLAN NOTE
New  vss  Check labs r/o anemia  Refer to cards for stress test  Suspect deconditioning but will r/o   resee 6 mos

## 2019-09-06 ENCOUNTER — APPOINTMENT (OUTPATIENT)
Dept: URBAN - METROPOLITAN AREA CLINIC 200 | Age: 59
Setting detail: DERMATOLOGY
End: 2019-09-11

## 2019-09-06 DIAGNOSIS — Z85.828 PERSONAL HISTORY OF OTHER MALIGNANT NEOPLASM OF SKIN: ICD-10-CM

## 2019-09-06 DIAGNOSIS — L57.8 OTHER SKIN CHANGES DUE TO CHRONIC EXPOSURE TO NONIONIZING RADIATION: ICD-10-CM

## 2019-09-06 DIAGNOSIS — L82.0 INFLAMED SEBORRHEIC KERATOSIS: ICD-10-CM

## 2019-09-06 DIAGNOSIS — D18.0 HEMANGIOMA: ICD-10-CM

## 2019-09-06 DIAGNOSIS — L98.8 OTHER SPECIFIED DISORDERS OF THE SKIN AND SUBCUTANEOUS TISSUE: ICD-10-CM

## 2019-09-06 PROBLEM — D23.71 OTHER BENIGN NEOPLASM OF SKIN OF RIGHT LOWER LIMB, INCLUDING HIP: Status: ACTIVE | Noted: 2019-09-06

## 2019-09-06 PROBLEM — D18.01 HEMANGIOMA OF SKIN AND SUBCUTANEOUS TISSUE: Status: ACTIVE | Noted: 2019-09-06

## 2019-09-06 PROCEDURE — 17110 DESTRUCT B9 LESION 1-14: CPT

## 2019-09-06 PROCEDURE — OTHER BENIGN DESTRUCTION COSMETIC: OTHER

## 2019-09-06 PROCEDURE — OTHER LIQUID NITROGEN: OTHER

## 2019-09-06 PROCEDURE — OTHER COUNSELING: OTHER

## 2019-09-06 PROCEDURE — 99213 OFFICE O/P EST LOW 20 MIN: CPT | Mod: 25

## 2019-09-06 PROCEDURE — OTHER MIPS QUALITY: OTHER

## 2019-09-06 PROCEDURE — OTHER BOTOX (U OR CC) ADDITIVE: OTHER

## 2019-09-06 PROCEDURE — OTHER BOTOX (U OR CC): OTHER

## 2019-09-06 ASSESSMENT — LOCATION DETAILED DESCRIPTION DERM
LOCATION DETAILED: SUPERIOR MID FOREHEAD
LOCATION DETAILED: LEFT FOREHEAD
LOCATION DETAILED: LEFT SUPERIOR LATERAL FOREHEAD
LOCATION DETAILED: GLABELLA
LOCATION DETAILED: RIGHT INFERIOR MEDIAL FOREHEAD
LOCATION DETAILED: LEFT MEDIAL SUPERIOR CHEST
LOCATION DETAILED: LEFT INFERIOR ANTERIOR NECK
LOCATION DETAILED: LEFT MEDIAL FOREHEAD
LOCATION DETAILED: RIGHT SUPERIOR FOREHEAD
LOCATION DETAILED: RIGHT FOREHEAD
LOCATION DETAILED: LEFT INFERIOR FOREHEAD
LOCATION DETAILED: LEFT LATERAL FOREHEAD
LOCATION DETAILED: LEFT ANTERIOR PROXIMAL THIGH

## 2019-09-06 ASSESSMENT — LOCATION SIMPLE DESCRIPTION DERM
LOCATION SIMPLE: LEFT THIGH
LOCATION SIMPLE: RIGHT FOREHEAD
LOCATION SIMPLE: LEFT ANTERIOR NECK
LOCATION SIMPLE: GLABELLA
LOCATION SIMPLE: SUPERIOR FOREHEAD
LOCATION SIMPLE: LEFT FOREHEAD
LOCATION SIMPLE: CHEST

## 2019-09-06 ASSESSMENT — LOCATION ZONE DERM
LOCATION ZONE: TRUNK
LOCATION ZONE: NECK
LOCATION ZONE: FACE
LOCATION ZONE: LEG

## 2019-09-06 NOTE — PROCEDURE: LIQUID NITROGEN
Add 52 Modifier (Optional): no
Detail Level: Detailed
Consent: The patient's consent was obtained including but not limited to risks of crusting, scabbing, blistering, scarring, darker or lighter pigmentary change, recurrence, incomplete removal and infection.
Medical Necessity Information: It is in your best interest to select a reason for this procedure from the list below. All of these items fulfill various CMS LCD requirements except the new and changing color options.
Include Z78.9 (Other Specified Conditions Influencing Health Status) As An Associated Diagnosis?: Yes
Post-Care Instructions: I reviewed with the patient in detail post-care instructions. Patient is to wear sunprotection, and avoid picking at any of the treated lesions. Pt may apply Vaseline to crusted or scabbing areas.
Medical Necessity Clause: This procedure was medically necessary because the lesions that were treated were: causing pain
Number Of Freeze-Thaw Cycles: 2 freeze-thaw cycles

## 2019-09-06 NOTE — PROCEDURE: MIPS QUALITY
Additional Notes: Shingles SHINGRIX vaccine not received due to it being on back order.
Quality 474: Zoster Vaccination Status: Shingrix vaccine was not administered for reasons documented by clinician (e.g. patient administered vaccine other than Shingrix, patient allergy or other medical reasons, patient declined or other patient reasons, vaccine not available or other system reasons)
Detail Level: Detailed

## 2019-09-06 NOTE — PROCEDURE: BENIGN DESTRUCTION COSMETIC
Anesthesia Type: 2% lidocaine with epinephrine
Price (Use Numbers Only, No Special Characters Or $): 25
Anesthesia Volume In Cc: 0
Post-Care Instructions: I reviewed with the patient in detail post-care instructions. Patient is to wear sunprotection, and avoid picking at any of the treated lesions. Pt may apply Vaseline to crusted or scabbing areas.
Consent: The patient's consent was obtained including but not limited to risks of crusting, scabbing, blistering, scarring, darker or lighter pigmentary change, recurrence, incomplete removal and infection.
Detail Level: Zone

## 2019-09-06 NOTE — PROCEDURE: LIQUID NITROGEN
Add 52 Modifier (Optional): no
Number Of Freeze-Thaw Cycles: 2 freeze-thaw cycles
Detail Level: Detailed
Medical Necessity Clause: This procedure was medically necessary because the lesions that were treated were: causing pain
Include Z78.9 (Other Specified Conditions Influencing Health Status) As An Associated Diagnosis?: Yes
Post-Care Instructions: I reviewed with the patient in detail post-care instructions. Patient is to wear sunprotection, and avoid picking at any of the treated lesions. Pt may apply Vaseline to crusted or scabbing areas.
Consent: The patient's consent was obtained including but not limited to risks of crusting, scabbing, blistering, scarring, darker or lighter pigmentary change, recurrence, incomplete removal and infection.
Medical Necessity Information: It is in your best interest to select a reason for this procedure from the list below. All of these items fulfill various CMS LCD requirements except the new and changing color options.

## 2019-09-19 LAB
ALBUMIN SERPL-MCNC: 4.2 G/DL (ref 3.6–5.1)
ALBUMIN/GLOB SERPL: 1.9 (CALC) (ref 1–2.5)
ALP SERPL-CCNC: 49 U/L (ref 33–130)
ALT SERPL-CCNC: 19 U/L (ref 6–29)
AST SERPL-CCNC: 20 U/L (ref 10–35)
BASOPHILS # BLD AUTO: 28 CELLS/UL (ref 0–200)
BASOPHILS NFR BLD AUTO: 0.6 %
BILIRUB SERPL-MCNC: 0.5 MG/DL (ref 0.2–1.2)
BUN SERPL-MCNC: 16 MG/DL (ref 7–25)
BUN/CREAT SERPL: NORMAL (CALC) (ref 6–22)
CALCIUM SERPL-MCNC: 9.4 MG/DL (ref 8.6–10.4)
CHLORIDE SERPL-SCNC: 104 MMOL/L (ref 98–110)
CHOLEST SERPL-MCNC: 250 MG/DL
CHOLEST/HDLC SERPL: 3.3 (CALC)
CO2 SERPL-SCNC: 29 MMOL/L (ref 20–32)
CREAT SERPL-MCNC: 0.65 MG/DL (ref 0.5–1.05)
EOSINOPHIL # BLD AUTO: 138 CELLS/UL (ref 15–500)
EOSINOPHIL NFR BLD AUTO: 3 %
ERYTHROCYTE [DISTWIDTH] IN BLOOD BY AUTOMATED COUNT: 14.2 % (ref 11–15)
GLOBULIN SER CALC-MCNC: 2.2 G/DL (CALC) (ref 1.9–3.7)
GLUCOSE SERPL-MCNC: 82 MG/DL (ref 65–99)
HCT VFR BLD AUTO: 40.4 % (ref 35–45)
HDLC SERPL-MCNC: 76 MG/DL
HGB BLD-MCNC: 13 G/DL (ref 11.7–15.5)
LDLC SERPL CALC-MCNC: 153 MG/DL (CALC)
LYMPHOCYTES # BLD AUTO: 1426 CELLS/UL (ref 850–3900)
LYMPHOCYTES NFR BLD AUTO: 31 %
MCH RBC QN AUTO: 28.4 PG (ref 27–33)
MCHC RBC AUTO-ENTMCNC: 32.2 G/DL (ref 32–36)
MCV RBC AUTO: 88.2 FL (ref 80–100)
MONOCYTES # BLD AUTO: 520 CELLS/UL (ref 200–950)
MONOCYTES NFR BLD AUTO: 11.3 %
NEUTROPHILS # BLD AUTO: 2489 CELLS/UL (ref 1500–7800)
NEUTROPHILS NFR BLD AUTO: 54.1 %
NONHDLC SERPL-MCNC: 174 MG/DL (CALC)
PLATELET # BLD AUTO: 314 THOUSAND/UL (ref 140–400)
PMV BLD REES-ECKER: 9.6 FL (ref 7.5–12.5)
POTASSIUM SERPL-SCNC: 5.1 MMOL/L (ref 3.5–5.3)
PROT SERPL-MCNC: 6.4 G/DL (ref 6.1–8.1)
QUEST EGFR NON-AFR. AMERICAN: 99 ML/MIN/1.73M2
RBC # BLD AUTO: 4.58 MILLION/UL (ref 3.8–5.1)
SODIUM SERPL-SCNC: 143 MMOL/L (ref 135–146)
TRIGL SERPL-MCNC: 98 MG/DL
TSH SERPL-ACNC: 2.01 MIU/L (ref 0.4–4.5)
WBC # BLD AUTO: 4.6 THOUSAND/UL (ref 3.8–10.8)

## 2019-12-06 ENCOUNTER — APPOINTMENT (OUTPATIENT)
Dept: URBAN - METROPOLITAN AREA CLINIC 200 | Age: 59
Setting detail: DERMATOLOGY
End: 2019-12-11

## 2019-12-06 DIAGNOSIS — L57.8 OTHER SKIN CHANGES DUE TO CHRONIC EXPOSURE TO NONIONIZING RADIATION: ICD-10-CM

## 2019-12-06 DIAGNOSIS — L98.8 OTHER SPECIFIED DISORDERS OF THE SKIN AND SUBCUTANEOUS TISSUE: ICD-10-CM

## 2019-12-06 DIAGNOSIS — B36.0 PITYRIASIS VERSICOLOR: ICD-10-CM

## 2019-12-06 PROBLEM — L57.0 ACTINIC KERATOSIS: Status: ACTIVE | Noted: 2019-12-06

## 2019-12-06 PROCEDURE — OTHER PRESCRIPTION MEDICATION MANAGEMENT: OTHER

## 2019-12-06 PROCEDURE — OTHER BOTOX (U OR CC): OTHER

## 2019-12-06 PROCEDURE — OTHER MIPS QUALITY: OTHER

## 2019-12-06 PROCEDURE — OTHER PRESCRIPTION: OTHER

## 2019-12-06 PROCEDURE — 99213 OFFICE O/P EST LOW 20 MIN: CPT

## 2019-12-06 PROCEDURE — OTHER COUNSELING: OTHER

## 2019-12-06 PROCEDURE — OTHER BOTOX (U OR CC) ADDITIVE: OTHER

## 2019-12-06 RX ORDER — KETOCONAZOLE 20 MG/ML
SHAMPOO TOPICAL
Qty: 1 | Refills: 3 | Status: ERX | COMMUNITY
Start: 2019-12-06

## 2019-12-06 ASSESSMENT — LOCATION DETAILED DESCRIPTION DERM
LOCATION DETAILED: RIGHT SUPERIOR FOREHEAD
LOCATION DETAILED: PERIUMBILICAL SKIN
LOCATION DETAILED: LEFT MEDIAL FOREHEAD
LOCATION DETAILED: RIGHT FOREHEAD
LOCATION DETAILED: LEFT INFERIOR FOREHEAD
LOCATION DETAILED: LEFT MEDIAL SUPERIOR CHEST
LOCATION DETAILED: LEFT FOREHEAD
LOCATION DETAILED: SUPERIOR MID FOREHEAD
LOCATION DETAILED: GLABELLA
LOCATION DETAILED: LEFT SUPERIOR LATERAL FOREHEAD
LOCATION DETAILED: RIGHT INFERIOR MEDIAL FOREHEAD

## 2019-12-06 ASSESSMENT — LOCATION ZONE DERM
LOCATION ZONE: TRUNK
LOCATION ZONE: FACE

## 2019-12-06 ASSESSMENT — LOCATION SIMPLE DESCRIPTION DERM
LOCATION SIMPLE: SUPERIOR FOREHEAD
LOCATION SIMPLE: RIGHT FOREHEAD
LOCATION SIMPLE: GLABELLA
LOCATION SIMPLE: CHEST
LOCATION SIMPLE: LEFT FOREHEAD
LOCATION SIMPLE: ABDOMEN

## 2019-12-06 NOTE — PROCEDURE: MIPS QUALITY
Detail Level: Detailed
Quality 110: Preventive Care And Screening: Influenza Immunization: Influenza Immunization previously received during influenza season
Quality 474: Zoster Vaccination Status: Shingrix vaccine was not administered for reasons documented by clinician (e.g. patient administered vaccine other than Shingrix, patient allergy or other medical reasons, patient declined or other patient reasons, vaccine not available or other system reasons)
Additional Notes: Shingles SHINGRIX vaccine not received due to it being on back order.

## 2019-12-06 NOTE — PROCEDURE: PRESCRIPTION MEDICATION MANAGEMENT
Initiate Treatment: ketoconazole 2 % shampoo
Detail Level: Detailed
Render In Strict Bullet Format?: No

## 2019-12-18 ENCOUNTER — OFFICE VISIT (OUTPATIENT)
Dept: PRIMARY CARE | Facility: CLINIC | Age: 59
End: 2019-12-18
Payer: COMMERCIAL

## 2019-12-18 VITALS
HEIGHT: 65 IN | WEIGHT: 164.4 LBS | RESPIRATION RATE: 12 BRPM | HEART RATE: 62 BPM | BODY MASS INDEX: 27.39 KG/M2 | DIASTOLIC BLOOD PRESSURE: 92 MMHG | OXYGEN SATURATION: 99 % | SYSTOLIC BLOOD PRESSURE: 132 MMHG | TEMPERATURE: 98.4 F

## 2019-12-18 DIAGNOSIS — I10 ESSENTIAL HYPERTENSION: Chronic | ICD-10-CM

## 2019-12-18 DIAGNOSIS — R30.0 DYSURIA: ICD-10-CM

## 2019-12-18 DIAGNOSIS — F33.41 RECURRENT MAJOR DEPRESSIVE DISORDER, IN PARTIAL REMISSION (CMS/HCC): Primary | Chronic | ICD-10-CM

## 2019-12-18 DIAGNOSIS — K21.9 GASTROESOPHAGEAL REFLUX DISEASE WITHOUT ESOPHAGITIS: ICD-10-CM

## 2019-12-18 PROBLEM — Z86.000 HISTORY OF DUCTAL CARCINOMA IN SITU (DCIS) OF BREAST: Status: ACTIVE | Noted: 2019-02-13

## 2019-12-18 PROBLEM — R06.09 DYSPNEA ON EXERTION: Status: RESOLVED | Noted: 2019-09-05 | Resolved: 2019-12-18

## 2019-12-18 LAB
BACTERIA URNS QL MICRO: ABNORMAL /HPF
BILIRUB UR QL STRIP.AUTO: NEGATIVE MG/DL
CLARITY UR REFRACT.AUTO: CLEAR
COLOR UR AUTO: YELLOW
GLUCOSE UR STRIP.AUTO-MCNC: NEGATIVE MG/DL
HGB UR QL STRIP.AUTO: NEGATIVE
HYALINE CASTS #/AREA URNS LPF: ABNORMAL /LPF
KETONES UR STRIP.AUTO-MCNC: NEGATIVE MG/DL
LEUKOCYTE ESTERASE UR QL STRIP.AUTO: 1
NITRITE UR QL STRIP.AUTO: NEGATIVE
PH UR STRIP.AUTO: 6 [PH]
PROT UR QL STRIP.AUTO: NEGATIVE
RBC #/AREA URNS HPF: ABNORMAL /HPF
SP GR UR REFRACT.AUTO: 1.02
SQUAMOUS URNS QL MICRO: 1 /HPF
UROBILINOGEN UR STRIP-ACNC: 0.2 EU/DL
WBC #/AREA URNS HPF: ABNORMAL /HPF

## 2019-12-18 PROCEDURE — 87086 URINE CULTURE/COLONY COUNT: CPT | Performed by: FAMILY MEDICINE

## 2019-12-18 PROCEDURE — 81001 URINALYSIS AUTO W/SCOPE: CPT | Performed by: FAMILY MEDICINE

## 2019-12-18 PROCEDURE — 99214 OFFICE O/P EST MOD 30 MIN: CPT | Performed by: FAMILY MEDICINE

## 2019-12-18 RX ORDER — PHENAZOPYRIDINE HYDROCHLORIDE 100 MG/1
100 TABLET, FILM COATED ORAL 3 TIMES DAILY PRN
Qty: 10 TABLET | Refills: 0 | Status: SHIPPED | OUTPATIENT
Start: 2019-12-18 | End: 2019-12-21

## 2019-12-18 RX ORDER — CIPROFLOXACIN 500 MG/1
500 TABLET ORAL 2 TIMES DAILY
Qty: 6 TABLET | Refills: 0 | Status: SHIPPED | OUTPATIENT
Start: 2019-12-18 | End: 2019-12-21

## 2019-12-18 RX ORDER — KETOCONAZOLE 20 MG/ML
SHAMPOO, SUSPENSION TOPICAL
Refills: 3 | COMMUNITY
Start: 2019-12-06 | End: 2021-12-13 | Stop reason: ALTCHOICE

## 2019-12-18 RX ORDER — OMEPRAZOLE 40 MG/1
40 CAPSULE, DELAYED RELEASE ORAL
Qty: 30 CAPSULE | Refills: 0 | Status: SHIPPED | OUTPATIENT
Start: 2019-12-18 | End: 2020-11-10 | Stop reason: ALTCHOICE

## 2019-12-18 ASSESSMENT — ENCOUNTER SYMPTOMS
ABDOMINAL PAIN: 0
DIFFICULTY URINATING: 0
FATIGUE: 0
ACTIVITY CHANGE: 0
DYSURIA: 1
DECREASED CONCENTRATION: 0
FLANK PAIN: 0
DIAPHORESIS: 0
CONSTIPATION: 0
HEMATURIA: 0
FREQUENCY: 0
CHILLS: 0
CONFUSION: 0
NAUSEA: 0
APPETITE CHANGE: 0
AGITATION: 0
DIARRHEA: 1

## 2019-12-18 NOTE — ASSESSMENT & PLAN NOTE
Deteriorated  Cut out etoh, red sauces, gravy  Short trial of omeprazole through holidays then step down therapy to pepcid bid prn

## 2019-12-18 NOTE — ASSESSMENT & PLAN NOTE
The 10-year ASCVD risk score (Rocío FITCH Jr., et al., 2013) is: 3.3%    Values used to calculate the score:      Age: 59 years      Sex: Female      Is Non- : No      Diabetic: No      Tobacco smoker: No      Systolic Blood Pressure: 132 mmHg      Is BP treated: Yes      HDL Cholesterol: 76 mg/dL      Total Cholesterol: 250 mg/dL     Mild elevation today   Usually in control

## 2019-12-18 NOTE — PATIENT INSTRUCTIONS
Patient Education     Food Choices for Gastroesophageal Reflux Disease, Adult  When you have gastroesophageal reflux disease (GERD), the foods you eat and your eating habits are very important. Choosing the right foods can help ease your discomfort. Think about working with a nutrition specialist (dietitian) to help you make good choices.  What are tips for following this plan?    Meals  · Choose healthy foods that are low in fat, such as fruits, vegetables, whole grains, low-fat dairy products, and lean meat, fish, and poultry.  · Eat small meals often instead of 3 large meals a day. Eat your meals slowly, and in a place where you are relaxed. Avoid bending over or lying down until 2-3 hours after eating.  · Avoid eating meals 2-3 hours before bed.  · Avoid drinking a lot of liquid with meals.  · Cook foods using methods other than frying. Bake, grill, or broil food instead.  · Avoid or limit:  ? Chocolate.  ? Peppermint or spearmint.  ? Alcohol.  ? Pepper.  ? Black and decaffeinated coffee.  ? Black and decaffeinated tea.  ? Bubbly (carbonated) soft drinks.  ? Caffeinated energy drinks and soft drinks.  · Limit high-fat foods such as:  ? Fatty meat or fried foods.  ? Whole milk, cream, butter, or ice cream.  ? Nuts and nut butters.  ? Pastries, donuts, and sweets made with butter or shortening.  · Avoid foods that cause symptoms. These foods may be different for everyone. Common foods that cause symptoms include:  ? Tomatoes.  ? Oranges, jim, and limes.  ? Peppers.  ? Spicy food.  ? Onions and garlic.  ? Vinegar.  Lifestyle  · Maintain a healthy weight. Ask your doctor what weight is healthy for you. If you need to lose weight, work with your doctor to do so safely.  · Exercise for at least 30 minutes for 5 or more days each week, or as told by your doctor.  · Wear loose-fitting clothes.  · Do not smoke. If you need help quitting, ask your doctor.  · Sleep with the head of your bed higher than your feet. Use a  wedge under the mattress or blocks under the bed frame to raise the head of the bed.  Summary  · When you have gastroesophageal reflux disease (GERD), food and lifestyle choices are very important in easing your symptoms.  · Eat small meals often instead of 3 large meals a day. Eat your meals slowly, and in a place where you are relaxed.  · Limit high-fat foods such as fatty meat or fried foods.  · Avoid bending over or lying down until 2-3 hours after eating.  · Avoid peppermint and spearmint, caffeine, alcohol, and chocolate.  This information is not intended to replace advice given to you by your health care provider. Make sure you discuss any questions you have with your health care provider.  Document Released: 06/18/2013 Document Revised: 01/23/2018 Document Reviewed: 01/23/2018  [employer] Interactive Patient Education © 2019 [employer]

## 2019-12-18 NOTE — PROGRESS NOTES
"Subjective      Patient ID: Francine Tuttle is a 59 y.o. female.  1960      One week   Burning with urination  No GH  No vag d/c  Chronic diarrhea  No f/c/s    GERD / heartburn flaring up  holdiay season - drinking champagneleathatherese.        The following have been reviewed and updated as appropriate in this visit:  Tobacco  Allergies  Meds  Problems  Med Hx  Surg Hx  Fam Hx  Soc Hx        Review of Systems   Constitutional: Negative for activity change, appetite change, chills, diaphoresis and fatigue.   Gastrointestinal: Positive for diarrhea. Negative for abdominal pain, constipation and nausea.   Genitourinary: Positive for dysuria and urgency. Negative for difficulty urinating, enuresis, flank pain, frequency, hematuria, menstrual problem, pelvic pain and vaginal bleeding.   Psychiatric/Behavioral: Negative for agitation, behavioral problems, confusion and decreased concentration.       Objective     Vitals:    12/18/19 1240   BP: (!) 132/92   BP Location: Right upper arm   Patient Position: Sitting   Pulse: 62   Resp: 12   Temp: 36.9 °C (98.4 °F)   TempSrc: Oral   SpO2: 99%   Weight: 74.6 kg (164 lb 6.4 oz)   Height: 1.638 m (5' 4.5\")     Body mass index is 27.78 kg/m².    Physical Exam   Constitutional: She is oriented to person, place, and time. She appears well-developed and well-nourished. No distress.   Cardiovascular: Normal rate, regular rhythm and normal heart sounds.   No murmur heard.  Pulmonary/Chest: Effort normal and breath sounds normal. No stridor. No respiratory distress. She has no wheezes.   Neurological: She is alert and oriented to person, place, and time.   Skin: Skin is warm and dry. She is not diaphoretic.   Psychiatric: She has a normal mood and affect. Her behavior is normal. Thought content normal.   Nursing note and vitals reviewed.      Assessment/Plan   Diagnoses and all orders for this visit:    Recurrent major depressive disorder, in partial remission (CMS/HCC) " (Primary)    Dysuria  Assessment & Plan:  Suspect UTI  Start abx, pyridium, await culture.    Orders:  -     Urinalysis with Reflex Culture  -     UA Reflex to Culture (Macroscopic)  -     phenazopyridine (PYRIDIUM) 100 mg tablet; Take 1 tablet (100 mg total) by mouth 3 (three) times a day as needed for bladder spasms for up to 3 days.  -     ciprofloxacin (CIPRO) 500 mg tablet; Take 1 tablet (500 mg total) by mouth 2 (two) times a day for 3 days.    Gastroesophageal reflux disease without esophagitis  Assessment & Plan:  Deteriorated  Cut out etoh, red sauces, gravy  Short trial of omeprazole through holidays then step down therapy to pepcid bid prn      Orders:  -     omeprazole (PriLOSEC) 40 mg capsule; Take 1 capsule (40 mg total) by mouth daily before breakfast.    Essential hypertension  Assessment & Plan:  The 10-year ASCVD risk score (Rocío FITCH Jr., et al., 2013) is: 3.3%    Values used to calculate the score:      Age: 59 years      Sex: Female      Is Non- : No      Diabetic: No      Tobacco smoker: No      Systolic Blood Pressure: 132 mmHg      Is BP treated: Yes      HDL Cholesterol: 76 mg/dL      Total Cholesterol: 250 mg/dL     Mild elevation today   Usually in control

## 2019-12-20 LAB
BACTERIA UR CULT: NORMAL
BACTERIA UR CULT: NORMAL

## 2020-03-03 RX ORDER — LISINOPRIL AND HYDROCHLOROTHIAZIDE 20; 25 MG/1; MG/1
TABLET ORAL
Qty: 90 TABLET | Refills: 3 | Status: SHIPPED | OUTPATIENT
Start: 2020-03-03 | End: 2020-11-10 | Stop reason: DRUGHIGH

## 2020-06-25 DIAGNOSIS — T14.8XXA BRUISING: Primary | ICD-10-CM

## 2020-06-26 ENCOUNTER — TELEPHONE (OUTPATIENT)
Dept: PRIMARY CARE | Facility: CLINIC | Age: 60
End: 2020-06-26

## 2020-06-26 DIAGNOSIS — T14.8XXA BRUISING: Primary | ICD-10-CM

## 2020-07-01 LAB
BASOPHILS # BLD AUTO: 0.1 X10E3/UL (ref 0–0.2)
BASOPHILS NFR BLD AUTO: 1 %
EOSINOPHIL # BLD AUTO: 0.1 X10E3/UL (ref 0–0.4)
EOSINOPHIL NFR BLD AUTO: 2 %
ERYTHROCYTE [DISTWIDTH] IN BLOOD BY AUTOMATED COUNT: 15.3 % (ref 11.7–15.4)
HCT VFR BLD AUTO: 40.2 % (ref 34–46.6)
HGB BLD-MCNC: 13.6 G/DL (ref 11.1–15.9)
IMM GRANULOCYTES # BLD AUTO: 0 X10E3/UL (ref 0–0.1)
IMM GRANULOCYTES NFR BLD AUTO: 0 %
LYMPHOCYTES # BLD AUTO: 1.7 X10E3/UL (ref 0.7–3.1)
LYMPHOCYTES NFR BLD AUTO: 28 %
MCH RBC QN AUTO: 29 PG (ref 26.6–33)
MCHC RBC AUTO-ENTMCNC: 33.8 G/DL (ref 31.5–35.7)
MCV RBC AUTO: 86 FL (ref 79–97)
MONOCYTES # BLD AUTO: 0.6 X10E3/UL (ref 0.1–0.9)
MONOCYTES NFR BLD AUTO: 11 %
NEUTROPHILS # BLD AUTO: 3.4 X10E3/UL (ref 1.4–7)
NEUTROPHILS NFR BLD AUTO: 58 %
PLATELET # BLD AUTO: 309 X10E3/UL (ref 150–450)
RBC # BLD AUTO: 4.69 X10E6/UL (ref 3.77–5.28)
WBC # BLD AUTO: 5.8 X10E3/UL (ref 3.4–10.8)

## 2020-07-02 ENCOUNTER — APPOINTMENT (OUTPATIENT)
Dept: URBAN - METROPOLITAN AREA CLINIC 200 | Age: 60
Setting detail: DERMATOLOGY
End: 2020-07-13

## 2020-07-02 DIAGNOSIS — L98.8 OTHER SPECIFIED DISORDERS OF THE SKIN AND SUBCUTANEOUS TISSUE: ICD-10-CM

## 2020-07-02 PROBLEM — Z85.828 PERSONAL HISTORY OF OTHER MALIGNANT NEOPLASM OF SKIN: Status: ACTIVE | Noted: 2020-07-02

## 2020-07-02 PROCEDURE — OTHER BOTOX (U OR CC) ADDITIVE: OTHER

## 2020-07-02 PROCEDURE — OTHER BOTOX (U OR CC): OTHER

## 2020-07-02 ASSESSMENT — LOCATION DETAILED DESCRIPTION DERM
LOCATION DETAILED: LEFT INFERIOR FOREHEAD
LOCATION DETAILED: SUPERIOR MID FOREHEAD
LOCATION DETAILED: RIGHT SUPERIOR FOREHEAD
LOCATION DETAILED: LEFT SUPERIOR LATERAL FOREHEAD
LOCATION DETAILED: LEFT MEDIAL FOREHEAD
LOCATION DETAILED: GLABELLA
LOCATION DETAILED: RIGHT INFERIOR MEDIAL FOREHEAD
LOCATION DETAILED: LEFT FOREHEAD
LOCATION DETAILED: RIGHT FOREHEAD

## 2020-07-02 ASSESSMENT — LOCATION ZONE DERM: LOCATION ZONE: FACE

## 2020-07-02 NOTE — PROCEDURE: BOTOX (U OR CC)
Quantity Per Injection Site (Units): 1.25
Lot #: [id number]
Quantity Per Injection Site (Units): 5
Post-Care Instructions: Patient instructed to not lie down for 4 hours and limit physical activity for 24 hours. Patient instructed not to travel by airplane for 48 hours.
Detail Level: Detailed
Quantity Per Injection Site (Units): 1
Price Per Unit Or Per Cc In $ (Use Numbers Only, No Special Characters Or $): 15
Consent: Written consent obtained. Risks include but not limited to lid/brow ptosis, bruising, swelling, diplopia, temporary effect, incomplete chemical denervation.
Measure In Units Or Cc's?: units

## 2020-08-13 RX ORDER — AZITHROMYCIN 250 MG/1
TABLET, FILM COATED ORAL
Qty: 6 TABLET | Refills: 0 | Status: SHIPPED | OUTPATIENT
Start: 2020-08-13 | End: 2020-08-18

## 2020-08-13 RX ORDER — ALBUTEROL SULFATE 90 UG/1
2 INHALANT RESPIRATORY (INHALATION) EVERY 4 HOURS PRN
Qty: 1 INHALER | Refills: 2 | Status: SHIPPED | OUTPATIENT
Start: 2020-08-13 | End: 2020-11-10

## 2020-09-08 ENCOUNTER — APPOINTMENT (OUTPATIENT)
Dept: URBAN - METROPOLITAN AREA CLINIC 200 | Age: 60
Setting detail: DERMATOLOGY
End: 2020-09-18

## 2020-09-08 DIAGNOSIS — Z85.828 PERSONAL HISTORY OF OTHER MALIGNANT NEOPLASM OF SKIN: ICD-10-CM

## 2020-09-08 DIAGNOSIS — L98.8 OTHER SPECIFIED DISORDERS OF THE SKIN AND SUBCUTANEOUS TISSUE: ICD-10-CM

## 2020-09-08 DIAGNOSIS — L57.8 OTHER SKIN CHANGES DUE TO CHRONIC EXPOSURE TO NONIONIZING RADIATION: ICD-10-CM

## 2020-09-08 PROBLEM — L55.1 SUNBURN OF SECOND DEGREE: Status: ACTIVE | Noted: 2020-09-08

## 2020-09-08 PROBLEM — L57.0 ACTINIC KERATOSIS: Status: ACTIVE | Noted: 2020-09-08

## 2020-09-08 PROCEDURE — 99213 OFFICE O/P EST LOW 20 MIN: CPT

## 2020-09-08 PROCEDURE — OTHER BOTOX (U OR CC): OTHER

## 2020-09-08 PROCEDURE — OTHER BOTOX (U OR CC) ADDITIVE: OTHER

## 2020-09-08 PROCEDURE — OTHER COUNSELING: OTHER

## 2020-09-08 ASSESSMENT — LOCATION DETAILED DESCRIPTION DERM
LOCATION DETAILED: SUPERIOR MID FOREHEAD
LOCATION DETAILED: RIGHT SUPERIOR FOREHEAD
LOCATION DETAILED: RIGHT INFERIOR MEDIAL FOREHEAD
LOCATION DETAILED: RIGHT FOREHEAD
LOCATION DETAILED: GLABELLA
LOCATION DETAILED: LEFT SUPERIOR LATERAL FOREHEAD
LOCATION DETAILED: LEFT MEDIAL SUPERIOR CHEST
LOCATION DETAILED: PERIUMBILICAL SKIN
LOCATION DETAILED: LEFT FOREHEAD
LOCATION DETAILED: LEFT INFERIOR FOREHEAD
LOCATION DETAILED: LEFT MEDIAL FOREHEAD

## 2020-09-08 ASSESSMENT — LOCATION SIMPLE DESCRIPTION DERM
LOCATION SIMPLE: CHEST
LOCATION SIMPLE: SUPERIOR FOREHEAD
LOCATION SIMPLE: RIGHT FOREHEAD
LOCATION SIMPLE: LEFT FOREHEAD
LOCATION SIMPLE: GLABELLA
LOCATION SIMPLE: ABDOMEN

## 2020-09-08 ASSESSMENT — LOCATION ZONE DERM
LOCATION ZONE: TRUNK
LOCATION ZONE: FACE

## 2020-11-10 ENCOUNTER — HOSPITAL ENCOUNTER (OUTPATIENT)
Dept: RADIOLOGY | Facility: HOSPITAL | Age: 60
Discharge: HOME | End: 2020-11-10
Attending: FAMILY MEDICINE
Payer: COMMERCIAL

## 2020-11-10 ENCOUNTER — OFFICE VISIT (OUTPATIENT)
Dept: PRIMARY CARE | Facility: CLINIC | Age: 60
End: 2020-11-10
Payer: COMMERCIAL

## 2020-11-10 VITALS
HEIGHT: 65 IN | TEMPERATURE: 97.1 F | HEART RATE: 71 BPM | WEIGHT: 162.8 LBS | DIASTOLIC BLOOD PRESSURE: 81 MMHG | RESPIRATION RATE: 16 BRPM | BODY MASS INDEX: 27.12 KG/M2 | SYSTOLIC BLOOD PRESSURE: 116 MMHG

## 2020-11-10 DIAGNOSIS — I10 ESSENTIAL HYPERTENSION: Primary | Chronic | ICD-10-CM

## 2020-11-10 DIAGNOSIS — M25.511 CHRONIC RIGHT SHOULDER PAIN: ICD-10-CM

## 2020-11-10 DIAGNOSIS — G89.29 CHRONIC RIGHT SHOULDER PAIN: ICD-10-CM

## 2020-11-10 DIAGNOSIS — F33.41 RECURRENT MAJOR DEPRESSIVE DISORDER, IN PARTIAL REMISSION (CMS/HCC): ICD-10-CM

## 2020-11-10 PROBLEM — R30.0 DYSURIA: Status: RESOLVED | Noted: 2019-12-18 | Resolved: 2020-11-10

## 2020-11-10 PROCEDURE — 99214 OFFICE O/P EST MOD 30 MIN: CPT | Performed by: FAMILY MEDICINE

## 2020-11-10 PROCEDURE — 73030 X-RAY EXAM OF SHOULDER: CPT | Mod: RT

## 2020-11-10 RX ORDER — LISINOPRIL AND HYDROCHLOROTHIAZIDE 10; 12.5 MG/1; MG/1
1 TABLET ORAL DAILY
Qty: 90 TABLET | Refills: 1 | COMMUNITY
Start: 2020-11-10 | End: 2021-01-04 | Stop reason: SDUPTHER

## 2020-11-10 ASSESSMENT — ENCOUNTER SYMPTOMS
ACTIVITY CHANGE: 1
ARTHRALGIAS: 1

## 2020-11-10 NOTE — ASSESSMENT & PLAN NOTE
The 10-year ASCVD risk score (Rocío FITCH Jr., et al., 2013) is: 2.8%    Values used to calculate the score:      Age: 60 years      Sex: Female      Is Non- : No      Diabetic: No      Tobacco smoker: No      Systolic Blood Pressure: 116 mmHg      Is BP treated: Yes      HDL Cholesterol: 76 mg/dL      Total Cholesterol: 250 mg/dL  bp lower side  Reduce bp med from 20/25 to 10/12.5  resee 6 mos  Cont home bp monitoring.  Due for fast labs.

## 2020-11-10 NOTE — PROGRESS NOTES
"      Subjective      Patient ID: Francine Tuttle is a 60 y.o. female.  1960      R shoulder pain  Pain at night  Pain with ADLS like dressing self with shirts etc. Injury 30 years ago but nothing recent  No swelling    HTN bp running on lower side  Walking 5 miles daily  No SE to meds otherwise  Used to be on lower dose of lisinopril hctz.        The following have been reviewed and updated as appropriate in this visit:  Meds  Problems       Review of Systems   Constitutional: Positive for activity change.   Musculoskeletal: Positive for arthralgias.   All other systems reviewed and are negative.      Objective     Vitals:    11/10/20 1311   BP: 116/81   BP Location: Left upper arm   Patient Position: Sitting   Pulse: 71   Resp: 16   Temp: 36.2 °C (97.1 °F)   TempSrc: Temporal   Weight: 73.8 kg (162 lb 12.8 oz)   Height: 1.638 m (5' 4.5\")     Body mass index is 27.51 kg/m².    Physical Exam  Nursing note reviewed.   Constitutional:       General: She is not in acute distress.     Appearance: Normal appearance. She is not ill-appearing, toxic-appearing or diaphoretic.   Cardiovascular:      Rate and Rhythm: Normal rate and regular rhythm.      Heart sounds: No murmur. No gallop.    Pulmonary:      Effort: Pulmonary effort is normal. No respiratory distress.      Breath sounds: No stridor. No wheezing or rhonchi.   Musculoskeletal:         General: Tenderness present. No swelling, deformity or signs of injury.      Comments: R with limited ROM shoulder  Tenderness reproduced with end range of flexion, abduction, adduction, no gross deformity   Neurological:      General: No focal deficit present.      Mental Status: She is alert.         Assessment/Plan   Diagnoses and all orders for this visit:    Essential hypertension (Primary)  Assessment & Plan:  The 10-year ASCVD risk score (Rocíopanfilo FITCH Jr., et al., 2013) is: 2.8%    Values used to calculate the score:      Age: 60 years      Sex: Female      Is " Non- : No      Diabetic: No      Tobacco smoker: No      Systolic Blood Pressure: 116 mmHg      Is BP treated: Yes      HDL Cholesterol: 76 mg/dL      Total Cholesterol: 250 mg/dL  bp lower side  Reduce bp med from 20/25 to 10/12.5  resee 6 mos  Cont home bp monitoring.  Due for fast labs.    Orders:  -     Lipid panel; Future  -     Comprehensive metabolic panel; Future    Chronic right shoulder pain  Assessment & Plan:  Suspect RTC tear  Xray, then MRI  Then f/u ortho    Orders:  -     X-RAY SHOULDER RIGHT 2+ VIEWS; Future  -     MRI SHOULDER RIGHT WITHOUT CONTRAST; Future    Recurrent major depressive disorder, in partial remission (CMS/HCC)  Assessment & Plan:  Stable  Doing well  Recent stressor daughter bipolar 1 episode this summer  Cont SSR therapy  Continue reg exercise.

## 2020-11-10 NOTE — ASSESSMENT & PLAN NOTE
Stable  Doing well  Recent stressor daughter bipolar 1 episode this summer  Cont SSR therapy  Continue reg exercise.

## 2020-11-18 LAB
ALBUMIN SERPL-MCNC: 4.3 G/DL (ref 3.6–5.1)
ALBUMIN/GLOB SERPL: 2 (CALC) (ref 1–2.5)
ALP SERPL-CCNC: 49 U/L (ref 37–153)
ALT SERPL-CCNC: 24 U/L (ref 6–29)
AST SERPL-CCNC: 19 U/L (ref 10–35)
BILIRUB SERPL-MCNC: 0.4 MG/DL (ref 0.2–1.2)
BUN SERPL-MCNC: 15 MG/DL (ref 7–25)
BUN/CREAT SERPL: ABNORMAL (CALC) (ref 6–22)
CALCIUM SERPL-MCNC: 9.6 MG/DL (ref 8.6–10.4)
CHLORIDE SERPL-SCNC: 105 MMOL/L (ref 98–110)
CHOLEST SERPL-MCNC: 241 MG/DL
CHOLEST/HDLC SERPL: 3.1 (CALC)
CO2 SERPL-SCNC: 33 MMOL/L (ref 20–32)
CREAT SERPL-MCNC: 0.64 MG/DL (ref 0.5–1.05)
GLOBULIN SER CALC-MCNC: 2.1 G/DL (CALC) (ref 1.9–3.7)
GLUCOSE SERPL-MCNC: 95 MG/DL (ref 65–99)
HDLC SERPL-MCNC: 78 MG/DL
LDLC SERPL CALC-MCNC: 142 MG/DL (CALC)
NONHDLC SERPL-MCNC: 163 MG/DL (CALC)
POTASSIUM SERPL-SCNC: 4.9 MMOL/L (ref 3.5–5.3)
PROT SERPL-MCNC: 6.4 G/DL (ref 6.1–8.1)
QUEST EGFR NON-AFR. AMERICAN: 98 ML/MIN/1.73M2
SODIUM SERPL-SCNC: 142 MMOL/L (ref 135–146)
TRIGL SERPL-MCNC: 100 MG/DL

## 2021-01-04 DIAGNOSIS — I10 ESSENTIAL HYPERTENSION: Chronic | ICD-10-CM

## 2021-01-04 RX ORDER — LISINOPRIL AND HYDROCHLOROTHIAZIDE 10; 12.5 MG/1; MG/1
1 TABLET ORAL DAILY
Qty: 90 TABLET | Refills: 1 | Status: SHIPPED | OUTPATIENT
Start: 2021-01-04 | End: 2021-07-06

## 2021-02-13 DIAGNOSIS — F33.41 RECURRENT MAJOR DEPRESSIVE DISORDER, IN PARTIAL REMISSION (CMS/HCC): Chronic | ICD-10-CM

## 2021-02-13 RX ORDER — ESCITALOPRAM OXALATE 5 MG/1
TABLET ORAL
Qty: 90 TABLET | Refills: 3 | Status: SHIPPED | OUTPATIENT
Start: 2021-02-13 | End: 2022-03-29 | Stop reason: ALTCHOICE

## 2021-02-25 ENCOUNTER — APPOINTMENT (OUTPATIENT)
Dept: URBAN - METROPOLITAN AREA CLINIC 200 | Age: 61
Setting detail: DERMATOLOGY
End: 2021-02-26

## 2021-02-25 DIAGNOSIS — L98.8 OTHER SPECIFIED DISORDERS OF THE SKIN AND SUBCUTANEOUS TISSUE: ICD-10-CM

## 2021-02-25 PROCEDURE — OTHER BOTOX (U OR CC) ADDITIVE: OTHER

## 2021-02-25 PROCEDURE — OTHER BOTOX (U OR CC): OTHER

## 2021-02-25 PROCEDURE — OTHER OTHER: OTHER

## 2021-02-25 ASSESSMENT — LOCATION ZONE DERM: LOCATION ZONE: FACE

## 2021-02-25 ASSESSMENT — LOCATION DETAILED DESCRIPTION DERM
LOCATION DETAILED: RIGHT FOREHEAD
LOCATION DETAILED: LEFT MEDIAL FOREHEAD
LOCATION DETAILED: LEFT INFERIOR FOREHEAD
LOCATION DETAILED: GLABELLA
LOCATION DETAILED: SUPERIOR MID FOREHEAD
LOCATION DETAILED: RIGHT INFERIOR MEDIAL FOREHEAD
LOCATION DETAILED: RIGHT SUPERIOR FOREHEAD
LOCATION DETAILED: LEFT SUPERIOR LATERAL FOREHEAD
LOCATION DETAILED: LEFT SUPERIOR CENTRAL BUCCAL CHEEK
LOCATION DETAILED: LEFT FOREHEAD

## 2021-02-25 ASSESSMENT — LOCATION SIMPLE DESCRIPTION DERM
LOCATION SIMPLE: LEFT FOREHEAD
LOCATION SIMPLE: SUPERIOR FOREHEAD
LOCATION SIMPLE: GLABELLA
LOCATION SIMPLE: RIGHT FOREHEAD
LOCATION SIMPLE: LEFT CHEEK

## 2021-02-25 NOTE — PROCEDURE: OTHER
Jean Patel MD,  I saw your patient today in the [facility].  If you have any questions, please do not hesitate to contact me.  Thank you!  MICHAEL Hairston
Render Risk Assessment In Note?: no
Note Text (......Xxx Chief Complaint.): This diagnosis correlates with the
Other (Free Text): Anastasia Magallon & Gustavo card given at today’s visit
Detail Level: Zone

## 2021-05-21 ENCOUNTER — APPOINTMENT (OUTPATIENT)
Dept: URBAN - METROPOLITAN AREA CLINIC 200 | Age: 61
Setting detail: DERMATOLOGY
End: 2021-05-30

## 2021-05-21 DIAGNOSIS — L98.8 OTHER SPECIFIED DISORDERS OF THE SKIN AND SUBCUTANEOUS TISSUE: ICD-10-CM

## 2021-05-21 PROCEDURE — OTHER BOTOX (U OR CC) ADDITIVE: OTHER

## 2021-05-21 PROCEDURE — OTHER BOTOX (U OR CC): OTHER

## 2021-05-21 ASSESSMENT — LOCATION DETAILED DESCRIPTION DERM
LOCATION DETAILED: LEFT FOREHEAD
LOCATION DETAILED: RIGHT SUPERIOR FOREHEAD
LOCATION DETAILED: RIGHT FOREHEAD
LOCATION DETAILED: LEFT INFERIOR FOREHEAD
LOCATION DETAILED: LEFT MEDIAL FOREHEAD
LOCATION DETAILED: SUPERIOR MID FOREHEAD
LOCATION DETAILED: RIGHT INFERIOR MEDIAL FOREHEAD
LOCATION DETAILED: LEFT SUPERIOR LATERAL FOREHEAD
LOCATION DETAILED: GLABELLA

## 2021-05-21 ASSESSMENT — LOCATION SIMPLE DESCRIPTION DERM
LOCATION SIMPLE: SUPERIOR FOREHEAD
LOCATION SIMPLE: RIGHT FOREHEAD
LOCATION SIMPLE: LEFT FOREHEAD
LOCATION SIMPLE: GLABELLA

## 2021-05-21 ASSESSMENT — LOCATION ZONE DERM: LOCATION ZONE: FACE

## 2021-07-06 DIAGNOSIS — I10 ESSENTIAL HYPERTENSION: Chronic | ICD-10-CM

## 2021-07-06 RX ORDER — LISINOPRIL AND HYDROCHLOROTHIAZIDE 10; 12.5 MG/1; MG/1
TABLET ORAL
Qty: 90 TABLET | Refills: 1 | Status: SHIPPED | OUTPATIENT
Start: 2021-07-06 | End: 2022-01-06

## 2021-08-27 ENCOUNTER — APPOINTMENT (OUTPATIENT)
Dept: URBAN - METROPOLITAN AREA CLINIC 200 | Age: 61
Setting detail: DERMATOLOGY
End: 2021-08-31

## 2021-08-27 ENCOUNTER — APPOINTMENT (OUTPATIENT)
Dept: URBAN - METROPOLITAN AREA CLINIC 200 | Age: 61
Setting detail: DERMATOLOGY
End: 2021-09-01

## 2021-08-27 DIAGNOSIS — Z11.52 ENCOUNTER FOR SCREENING FOR COVID-19: ICD-10-CM

## 2021-08-27 DIAGNOSIS — L57.8 OTHER SKIN CHANGES DUE TO CHRONIC EXPOSURE TO NONIONIZING RADIATION: ICD-10-CM

## 2021-08-27 DIAGNOSIS — L98.8 OTHER SPECIFIED DISORDERS OF THE SKIN AND SUBCUTANEOUS TISSUE: ICD-10-CM

## 2021-08-27 DIAGNOSIS — Z85.828 PERSONAL HISTORY OF OTHER MALIGNANT NEOPLASM OF SKIN: ICD-10-CM

## 2021-08-27 DIAGNOSIS — L91.8 OTHER HYPERTROPHIC DISORDERS OF THE SKIN: ICD-10-CM

## 2021-08-27 PROBLEM — D23.5 OTHER BENIGN NEOPLASM OF SKIN OF TRUNK: Status: ACTIVE | Noted: 2021-08-27

## 2021-08-27 PROCEDURE — OTHER SCREENING FOR COVID-19: OTHER

## 2021-08-27 PROCEDURE — OTHER BOTOX (U OR CC) ADDITIVE: OTHER

## 2021-08-27 PROCEDURE — OTHER REASSURANCE: OTHER

## 2021-08-27 PROCEDURE — OTHER SUNSCREEN RECOMMENDATIONS: OTHER

## 2021-08-27 PROCEDURE — 99213 OFFICE O/P EST LOW 20 MIN: CPT

## 2021-08-27 PROCEDURE — OTHER COUNSELING: OTHER

## 2021-08-27 PROCEDURE — OTHER BOTOX (U OR CC): OTHER

## 2021-08-27 ASSESSMENT — LOCATION SIMPLE DESCRIPTION DERM
LOCATION SIMPLE: LEFT ANTERIOR NECK
LOCATION SIMPLE: SUPERIOR FOREHEAD
LOCATION SIMPLE: RIGHT FOREHEAD
LOCATION SIMPLE: ABDOMEN
LOCATION SIMPLE: GLABELLA
LOCATION SIMPLE: LOWER BACK
LOCATION SIMPLE: LEFT FOREHEAD

## 2021-08-27 ASSESSMENT — LOCATION DETAILED DESCRIPTION DERM
LOCATION DETAILED: RIGHT INFERIOR MEDIAL FOREHEAD
LOCATION DETAILED: SUPERIOR LUMBAR SPINE
LOCATION DETAILED: LEFT MEDIAL FOREHEAD
LOCATION DETAILED: GLABELLA
LOCATION DETAILED: LEFT FOREHEAD
LOCATION DETAILED: LEFT INFERIOR ANTERIOR NECK
LOCATION DETAILED: RIGHT FOREHEAD
LOCATION DETAILED: PERIUMBILICAL SKIN
LOCATION DETAILED: RIGHT SUPERIOR FOREHEAD
LOCATION DETAILED: LEFT INFERIOR FOREHEAD
LOCATION DETAILED: LEFT SUPERIOR LATERAL FOREHEAD
LOCATION DETAILED: SUPERIOR MID FOREHEAD

## 2021-08-27 ASSESSMENT — LOCATION ZONE DERM
LOCATION ZONE: TRUNK
LOCATION ZONE: FACE
LOCATION ZONE: NECK

## 2021-08-27 NOTE — PROCEDURE: REASSURANCE
Additional Note: Cryotherapy
Hide Include Location In Plan Question?: No
Include Location In Plan?: Yes
Detail Level: Simple

## 2021-11-10 ENCOUNTER — TELEPHONE (OUTPATIENT)
Dept: PRIMARY CARE | Facility: CLINIC | Age: 61
End: 2021-11-10

## 2021-11-10 DIAGNOSIS — Z12.31 SCREENING MAMMOGRAM FOR BREAST CANCER: Primary | ICD-10-CM

## 2021-11-19 ENCOUNTER — APPOINTMENT (OUTPATIENT)
Dept: URBAN - METROPOLITAN AREA CLINIC 200 | Age: 61
Setting detail: DERMATOLOGY
End: 2021-11-22

## 2021-11-19 DIAGNOSIS — L98.8 OTHER SPECIFIED DISORDERS OF THE SKIN AND SUBCUTANEOUS TISSUE: ICD-10-CM

## 2021-11-19 DIAGNOSIS — Z11.52 ENCOUNTER FOR SCREENING FOR COVID-19: ICD-10-CM

## 2021-11-19 PROBLEM — L57.0 ACTINIC KERATOSIS: Status: ACTIVE | Noted: 2021-11-19

## 2021-11-19 PROCEDURE — OTHER BOTOX (U OR CC): OTHER

## 2021-11-19 PROCEDURE — OTHER SCREENING FOR COVID-19: OTHER

## 2021-11-19 PROCEDURE — OTHER BOTOX (U OR CC) ADDITIVE: OTHER

## 2021-11-19 ASSESSMENT — LOCATION DETAILED DESCRIPTION DERM
LOCATION DETAILED: RIGHT INFERIOR MEDIAL FOREHEAD
LOCATION DETAILED: RIGHT FOREHEAD
LOCATION DETAILED: PERIUMBILICAL SKIN
LOCATION DETAILED: LEFT MEDIAL FOREHEAD
LOCATION DETAILED: SUPERIOR MID FOREHEAD
LOCATION DETAILED: GLABELLA
LOCATION DETAILED: LEFT SUPERIOR LATERAL FOREHEAD
LOCATION DETAILED: RIGHT SUPERIOR FOREHEAD
LOCATION DETAILED: LEFT INFERIOR FOREHEAD
LOCATION DETAILED: LEFT FOREHEAD

## 2021-11-19 ASSESSMENT — LOCATION ZONE DERM
LOCATION ZONE: TRUNK
LOCATION ZONE: FACE

## 2021-11-19 ASSESSMENT — LOCATION SIMPLE DESCRIPTION DERM
LOCATION SIMPLE: ABDOMEN
LOCATION SIMPLE: RIGHT FOREHEAD
LOCATION SIMPLE: SUPERIOR FOREHEAD
LOCATION SIMPLE: GLABELLA
LOCATION SIMPLE: LEFT FOREHEAD

## 2021-11-29 ENCOUNTER — HOSPITAL ENCOUNTER (OUTPATIENT)
Dept: RADIOLOGY | Age: 61
Discharge: HOME | End: 2021-11-29
Attending: FAMILY MEDICINE
Payer: COMMERCIAL

## 2021-11-29 DIAGNOSIS — Z12.31 SCREENING MAMMOGRAM FOR BREAST CANCER: ICD-10-CM

## 2021-11-29 PROCEDURE — 77063 BREAST TOMOSYNTHESIS BI: CPT

## 2021-12-13 ENCOUNTER — OFFICE VISIT (OUTPATIENT)
Dept: PRIMARY CARE | Facility: CLINIC | Age: 61
End: 2021-12-13
Payer: COMMERCIAL

## 2021-12-13 VITALS
WEIGHT: 160.4 LBS | RESPIRATION RATE: 16 BRPM | TEMPERATURE: 98 F | SYSTOLIC BLOOD PRESSURE: 113 MMHG | OXYGEN SATURATION: 99 % | HEIGHT: 65 IN | HEART RATE: 66 BPM | DIASTOLIC BLOOD PRESSURE: 79 MMHG | BODY MASS INDEX: 26.73 KG/M2

## 2021-12-13 DIAGNOSIS — Z01.419 WELL WOMAN EXAM WITH ROUTINE GYNECOLOGICAL EXAM: Primary | ICD-10-CM

## 2021-12-13 DIAGNOSIS — I10 ESSENTIAL HYPERTENSION: ICD-10-CM

## 2021-12-13 DIAGNOSIS — F33.41 RECURRENT MAJOR DEPRESSIVE DISORDER, IN PARTIAL REMISSION (CMS/HCC): Chronic | ICD-10-CM

## 2021-12-13 DIAGNOSIS — Z12.4 SCREENING FOR CERVICAL CANCER: ICD-10-CM

## 2021-12-13 PROCEDURE — 3074F SYST BP LT 130 MM HG: CPT | Performed by: FAMILY MEDICINE

## 2021-12-13 PROCEDURE — 3008F BODY MASS INDEX DOCD: CPT | Performed by: FAMILY MEDICINE

## 2021-12-13 PROCEDURE — 99396 PREV VISIT EST AGE 40-64: CPT | Performed by: FAMILY MEDICINE

## 2021-12-13 PROCEDURE — 3078F DIAST BP <80 MM HG: CPT | Performed by: FAMILY MEDICINE

## 2021-12-13 ASSESSMENT — ENCOUNTER SYMPTOMS
FEVER: 0
DIARRHEA: 0
APPETITE CHANGE: 0
DIFFICULTY URINATING: 0
SHORTNESS OF BREATH: 0
CONSTIPATION: 0
UNEXPECTED WEIGHT CHANGE: 0
ACTIVITY CHANGE: 0

## 2021-12-13 NOTE — PROGRESS NOTES
"      Subjective      Patient ID: Francine Tuttle is a 61 y.o. female.  1960      Here for pap  [place] l ear  Flashing lights right lateral vision intermittently; seeing [place] [facility] this wed.        The following have been reviewed and updated as appropriate in this visit:   Tobacco  Allergies  Meds  Problems  Med Hx  Surg Hx  Fam Hx       Review of Systems   Constitutional: Negative for activity change, appetite change, fever and unexpected weight change.   HENT: Positive for hearing loss.    Eyes: Positive for visual disturbance.   Respiratory: Negative for shortness of breath.    Cardiovascular: Negative for chest pain and leg swelling.   Gastrointestinal: Negative for constipation and diarrhea.   Genitourinary: Negative for difficulty urinating and menstrual problem.   All other systems reviewed and are negative.      Objective     Vitals:    12/13/21 1144   BP: 113/79   BP Location: Left upper arm   Patient Position: Sitting   Pulse: 66   Resp: 16   Temp: 36.7 °C (98 °F)   TempSrc: Temporal   SpO2: 99%   Weight: 72.8 kg (160 lb 6.4 oz)   Height: 1.638 m (5' 4.5\")     Body mass index is 27.11 kg/m².    Physical Exam  Vitals and nursing note reviewed.   Constitutional:       General: She is not in acute distress.     Appearance: Normal appearance. She is well-developed. She is not ill-appearing, toxic-appearing or diaphoretic.   HENT:      Head: Normocephalic and atraumatic.      Right Ear: There is impacted cerumen.      Left Ear: There is impacted cerumen.   Eyes:      General: No scleral icterus.        Right eye: No discharge.         Left eye: No discharge.      Conjunctiva/sclera: Conjunctivae normal.   Neck:      Thyroid: No thyromegaly.      Vascular: No JVD.   Cardiovascular:      Rate and Rhythm: Normal rate and regular rhythm.      Heart sounds: No murmur heard.    No gallop.   Pulmonary:      Effort: Pulmonary effort is normal. No respiratory distress.      Breath sounds: No stridor. No " wheezing or rhonchi.   Genitourinary:     General: Normal vulva.      Vagina: No vaginal discharge.   Musculoskeletal:      Right lower leg: No edema.      Left lower leg: No edema.   Skin:     General: Skin is warm and dry.      Coloration: Skin is not jaundiced or pale.      Findings: Erythema present.   Neurological:      General: No focal deficit present.      Mental Status: She is alert and oriented to person, place, and time. Mental status is at baseline.      Cranial Nerves: No cranial nerve deficit.   Psychiatric:         Mood and Affect: Mood normal.         Behavior: Behavior normal.         Thought Content: Thought content normal.         Judgment: Judgment normal.         Assessment/Plan   Diagnoses and all orders for this visit:    Well woman exam with routine gynecological exam (Primary)  Assessment & Plan:  Age appropriate AG provided to pt  Repeat flp, cmp 3 mos  Due for shingrix 2 at [facility]  Due for tdap next ov.  cscope utd  resee 6 mos bp      Recurrent major depressive disorder, in partial remission (CMS/HCC)    Essential hypertension  -     Lipid panel; Future  -     Comprehensive metabolic panel; Future    Screening for cervical cancer  -     Cytology, Thinprep Pap    Plans to retire 4/2022  Plans to wean down lexapro too.  resee 6 mos bp

## 2021-12-13 NOTE — ASSESSMENT & PLAN NOTE
Age appropriate AG provided to pt  Repeat flp, cmp 3 mos  Due for [facility]rix 2 at [facility]  Due for tdap next ov.  cscope utd  resee 6 mos bp

## 2021-12-16 LAB
CLINICAL INFO: NORMAL
CYTO CVX: NORMAL
CYTOLOGIST CVX/VAG CYTO: NORMAL
CYTOLOGY CMNT CVX/VAG CYTO-IMP: NORMAL
DATE OF PREVIOUS PAP: NORMAL
DATE PREVIOUS BX: NORMAL
HPV E6+E7 MRNA CVX QL NAA+PROBE: NOT DETECTED
LMP START DATE: NORMAL
QUEST COMMENT (PAP): NORMAL
SPECIMEN SOURCE CVX/VAG CYTO: NORMAL
STAT OF ADQ CVX/VAG CYTO-IMP: NORMAL

## 2022-02-18 ENCOUNTER — APPOINTMENT (OUTPATIENT)
Dept: URBAN - METROPOLITAN AREA CLINIC 200 | Age: 62
Setting detail: DERMATOLOGY
End: 2022-02-20

## 2022-02-18 DIAGNOSIS — L98.8 OTHER SPECIFIED DISORDERS OF THE SKIN AND SUBCUTANEOUS TISSUE: ICD-10-CM

## 2022-02-18 DIAGNOSIS — L72.0 EPIDERMAL CYST: ICD-10-CM

## 2022-02-18 DIAGNOSIS — D485 NEOPLASM OF UNCERTAIN BEHAVIOR OF SKIN: ICD-10-CM

## 2022-02-18 DIAGNOSIS — L91.8 OTHER HYPERTROPHIC DISORDERS OF THE SKIN: ICD-10-CM

## 2022-02-18 DIAGNOSIS — Z11.52 ENCOUNTER FOR SCREENING FOR COVID-19: ICD-10-CM

## 2022-02-18 PROBLEM — L57.0 ACTINIC KERATOSIS: Status: ACTIVE | Noted: 2022-02-18

## 2022-02-18 PROBLEM — Z85.828 PERSONAL HISTORY OF OTHER MALIGNANT NEOPLASM OF SKIN: Status: ACTIVE | Noted: 2022-02-18

## 2022-02-18 PROBLEM — D48.5 NEOPLASM OF UNCERTAIN BEHAVIOR OF SKIN: Status: ACTIVE | Noted: 2022-02-18

## 2022-02-18 PROBLEM — L55.1 SUNBURN OF SECOND DEGREE: Status: ACTIVE | Noted: 2022-02-18

## 2022-02-18 PROCEDURE — OTHER BOTOX (U OR CC): OTHER

## 2022-02-18 PROCEDURE — OTHER BOTOX (U OR CC) ADDITIVE: OTHER

## 2022-02-18 PROCEDURE — OTHER SCREENING FOR COVID-19: OTHER

## 2022-02-18 PROCEDURE — OTHER REASSURANCE: OTHER

## 2022-02-18 PROCEDURE — 99212 OFFICE O/P EST SF 10 MIN: CPT | Mod: 25

## 2022-02-18 PROCEDURE — 11104 PUNCH BX SKIN SINGLE LESION: CPT

## 2022-02-18 PROCEDURE — OTHER BIOPSY BY PUNCH METHOD: OTHER

## 2022-02-18 ASSESSMENT — LOCATION DETAILED DESCRIPTION DERM
LOCATION DETAILED: RIGHT MID-UPPER BACK
LOCATION DETAILED: LEFT SUPERIOR LATERAL FOREHEAD
LOCATION DETAILED: RIGHT INFERIOR MEDIAL FOREHEAD
LOCATION DETAILED: RIGHT LATERAL CANTHUS
LOCATION DETAILED: GLABELLA
LOCATION DETAILED: LEFT INFERIOR FOREHEAD
LOCATION DETAILED: LEFT MEDIAL FOREHEAD
LOCATION DETAILED: LEFT FOREHEAD
LOCATION DETAILED: SUPERIOR MID FOREHEAD
LOCATION DETAILED: RIGHT SUPERIOR FOREHEAD
LOCATION DETAILED: PERIUMBILICAL SKIN
LOCATION DETAILED: RIGHT FOREHEAD

## 2022-02-18 ASSESSMENT — LOCATION ZONE DERM
LOCATION ZONE: TRUNK
LOCATION ZONE: FACE
LOCATION ZONE: EYELID
LOCATION ZONE: TRUNK

## 2022-02-18 ASSESSMENT — LOCATION SIMPLE DESCRIPTION DERM
LOCATION SIMPLE: SUPERIOR FOREHEAD
LOCATION SIMPLE: RIGHT UPPER BACK
LOCATION SIMPLE: ABDOMEN
LOCATION SIMPLE: RIGHT EYELID
LOCATION SIMPLE: RIGHT FOREHEAD
LOCATION SIMPLE: GLABELLA
LOCATION SIMPLE: LEFT FOREHEAD

## 2022-02-18 ASSESSMENT — PAIN INTENSITY VAS: HOW INTENSE IS YOUR PAIN 0 BEING NO PAIN, 10 BEING THE MOST SEVERE PAIN POSSIBLE?: NO PAIN

## 2022-02-18 NOTE — PROCEDURE: BIOPSY BY PUNCH METHOD
Biopsy Type: H and E
Patient Will Remove Sutures At Home?: No
Anesthesia Type: 1% lidocaine without epinephrine
Wound Care: Vaseline
X Depth Of Punch In Cm (Optional): 0
Information: Selecting Yes will display possible errors in your note based on the variables you have selected. This validation is only offered as a suggestion for you. PLEASE NOTE THAT THE VALIDATION TEXT WILL BE REMOVED WHEN YOU FINALIZE YOUR NOTE. IF YOU WANT TO FAX A PRELIMINARY NOTE YOU WILL NEED TO TOGGLE THIS TO 'NO' IF YOU DO NOT WANT IT IN YOUR FAXED NOTE.
Billing Type: Third-Party Bill
Anesthesia Volume In Cc (Will Not Render If 0): 1
Dressing: pressure dressing
Epidermal Sutures: 4-0 Nylon
Detail Level: Detailed
Notification Instructions: Patient will be notified of biopsy results. However, patient instructed to call the office if not contacted within 2 weeks.
Home Suture Removal Text: Patient was provided a home suture removal kit and will remove their sutures at home.  If they have any questions or difficulties they will call the office.
Suture Removal: 7 days
Consent: Written consent was obtained and risks were reviewed including but not limited to scarring, infection, bleeding, scabbing, incomplete removal, nerve damage and allergy to anesthesia.
Post-Care Instructions: I reviewed with the patient in detail post-care instructions. Patient is to keep the biopsy site dry overnight, and then apply bacitracin twice daily until healed. Patient may apply hydrogen peroxide soaks to remove any crusting.
Punch Size In Mm: 4
Hemostasis: None
Validate Note Data (See Information Below): Yes

## 2022-03-07 ENCOUNTER — APPOINTMENT (OUTPATIENT)
Dept: URBAN - METROPOLITAN AREA CLINIC 200 | Age: 62
Setting detail: DERMATOLOGY
End: 2022-03-09

## 2022-03-07 DIAGNOSIS — L72.8 OTHER FOLLICULAR CYSTS OF THE SKIN AND SUBCUTANEOUS TISSUE: ICD-10-CM

## 2022-03-07 PROBLEM — L55.1 SUNBURN OF SECOND DEGREE: Status: ACTIVE | Noted: 2022-03-07

## 2022-03-07 PROCEDURE — OTHER SUTURE REMOVAL (GLOBAL PERIOD): OTHER

## 2022-03-07 ASSESSMENT — LOCATION ZONE DERM: LOCATION ZONE: TRUNK

## 2022-03-07 ASSESSMENT — LOCATION DETAILED DESCRIPTION DERM: LOCATION DETAILED: RIGHT MID-UPPER BACK

## 2022-03-07 ASSESSMENT — LOCATION SIMPLE DESCRIPTION DERM: LOCATION SIMPLE: RIGHT UPPER BACK

## 2022-03-07 NOTE — PROCEDURE: SUTURE REMOVAL (GLOBAL PERIOD)
Detail Level: Detailed
Add 29855 Cpt? (Important Note: In 2017 The Use Of 28934 Is Being Tracked By Cms To Determine Future Global Period Reimbursement For Global Periods): no

## 2022-03-28 PROBLEM — Z01.419 WELL WOMAN EXAM WITH ROUTINE GYNECOLOGICAL EXAM: Status: RESOLVED | Noted: 2021-12-13 | Resolved: 2022-03-28

## 2022-03-29 ENCOUNTER — OFFICE VISIT (OUTPATIENT)
Dept: PRIMARY CARE | Facility: CLINIC | Age: 62
End: 2022-03-29
Payer: COMMERCIAL

## 2022-03-29 VITALS
BODY MASS INDEX: 26.76 KG/M2 | HEIGHT: 65 IN | HEART RATE: 62 BPM | OXYGEN SATURATION: 99 % | RESPIRATION RATE: 16 BRPM | WEIGHT: 160.6 LBS | SYSTOLIC BLOOD PRESSURE: 137 MMHG | DIASTOLIC BLOOD PRESSURE: 80 MMHG

## 2022-03-29 DIAGNOSIS — I10 ESSENTIAL HYPERTENSION: Chronic | ICD-10-CM

## 2022-03-29 DIAGNOSIS — F33.41 RECURRENT MAJOR DEPRESSIVE DISORDER, IN PARTIAL REMISSION (CMS/HCC): Chronic | ICD-10-CM

## 2022-03-29 DIAGNOSIS — M62.838 CERVICAL PARASPINAL MUSCLE SPASM: Primary | ICD-10-CM

## 2022-03-29 PROCEDURE — 3008F BODY MASS INDEX DOCD: CPT | Performed by: FAMILY MEDICINE

## 2022-03-29 PROCEDURE — 3075F SYST BP GE 130 - 139MM HG: CPT | Performed by: FAMILY MEDICINE

## 2022-03-29 PROCEDURE — 99213 OFFICE O/P EST LOW 20 MIN: CPT | Performed by: FAMILY MEDICINE

## 2022-03-29 PROCEDURE — 3079F DIAST BP 80-89 MM HG: CPT | Performed by: FAMILY MEDICINE

## 2022-03-29 ASSESSMENT — ENCOUNTER SYMPTOMS
NECK PAIN: 1
MYALGIAS: 1
APPETITE CHANGE: 0
NUMBNESS: 0
ARTHRALGIAS: 0
ACTIVITY CHANGE: 0
WEAKNESS: 0
FEVER: 0
NECK STIFFNESS: 1
CHILLS: 0
JOINT SWELLING: 0
BACK PAIN: 0
NERVOUS/ANXIOUS: 1

## 2022-03-29 NOTE — ASSESSMENT & PLAN NOTE
May safely use motrin 600 mg with food every 6 hours for 3-5 days for flares  Take with food; stay well hydrated  Def mm relaxers  Call if sxs continue >6 weeks.

## 2022-03-29 NOTE — PROGRESS NOTES
"      Subjective      Patient ID: Francine Tuttle is a 61 y.o. female.  1960      Woke up out of sleep on sat with severe left sided neck pain  10/10 but improved today 5/10 only when moves certain ways; alyce if tilts neck to R  seering pain, constant whole night when started.  No radiation  No weakness.  Took motrin 2 on Sunday and one yesterday.  No injury        The following have been reviewed and updated as appropriate in this visit:   Tobacco  Allergies  Meds  Problems  Med Hx  Surg Hx  Fam Hx         Review of Systems   Constitutional: Negative for activity change, appetite change, chills and fever.   Musculoskeletal: Positive for myalgias, neck pain and neck stiffness. Negative for arthralgias, back pain, gait problem and joint swelling.   Neurological: Negative for weakness and numbness.   Psychiatric/Behavioral: The patient is nervous/anxious.    All other systems reviewed and are negative.      Objective     Vitals:    03/29/22 1140   BP: 137/80   BP Location: Left upper arm   Patient Position: Sitting   Pulse: 62   Resp: 16   SpO2: 99%   Weight: 72.8 kg (160 lb 9.6 oz)   Height: 1.638 m (5' 4.5\")     Body mass index is 27.14 kg/m².    Physical Exam  Vitals and nursing note reviewed.   Constitutional:       General: She is not in acute distress.     Appearance: Normal appearance. She is not ill-appearing or toxic-appearing.   HENT:      Right Ear: Tympanic membrane, ear canal and external ear normal.      Left Ear: Tympanic membrane, ear canal and external ear normal.   Neck:      Comments: Left paracervical muscle belly ttp  No LAD, no masses  Musculoskeletal:         General: Tenderness present. No swelling, deformity or signs of injury. Normal range of motion.      Cervical back: Neck supple. Tenderness present. No rigidity.   Lymphadenopathy:      Cervical: No cervical adenopathy.   Skin:     General: Skin is warm and dry.      Coloration: Skin is not jaundiced or pale. "   Neurological:      General: No focal deficit present.      Mental Status: She is alert and oriented to person, place, and time. Mental status is at baseline.      Motor: No weakness.   Psychiatric:         Mood and Affect: Mood normal.         Behavior: Behavior normal.         Thought Content: Thought content normal.         Judgment: Judgment normal.         Assessment/Plan   Diagnoses and all orders for this visit:    Cervical paraspinal muscle spasm (Primary)  Assessment & Plan:  May safely use motrin 600 mg with food every 6 hours for 3-5 days for flares  Take with food; stay well hydrated  Def mm relaxers  Call if sxs continue >6 weeks.        Recurrent major depressive disorder, in partial remission (CMS/HCC)  Assessment & Plan:  Doing well off ssri therapy        Essential hypertension  Assessment & Plan:  vss on lisinopril hctz  Has lab scheduled tomorrow for cmp, flp

## 2022-03-30 LAB
ALBUMIN SERPL-MCNC: 4.3 G/DL (ref 3.6–5.1)
ALBUMIN/GLOB SERPL: 1.9 (CALC) (ref 1–2.5)
ALP SERPL-CCNC: 59 U/L (ref 37–153)
ALT SERPL-CCNC: 16 U/L (ref 6–29)
AST SERPL-CCNC: 16 U/L (ref 10–35)
BILIRUB SERPL-MCNC: 0.4 MG/DL (ref 0.2–1.2)
BUN SERPL-MCNC: 15 MG/DL (ref 7–25)
BUN/CREAT SERPL: ABNORMAL (CALC) (ref 6–22)
CALCIUM SERPL-MCNC: 9.5 MG/DL (ref 8.6–10.4)
CHLORIDE SERPL-SCNC: 106 MMOL/L (ref 98–110)
CHOLEST SERPL-MCNC: 240 MG/DL
CHOLEST/HDLC SERPL: 3 (CALC)
CO2 SERPL-SCNC: 33 MMOL/L (ref 20–32)
CREAT SERPL-MCNC: 0.58 MG/DL (ref 0.5–1.05)
GLOBULIN SER CALC-MCNC: 2.3 G/DL (CALC) (ref 1.9–3.7)
GLUCOSE SERPL-MCNC: 78 MG/DL (ref 65–99)
HDLC SERPL-MCNC: 81 MG/DL
LDLC SERPL CALC-MCNC: 139 MG/DL (CALC)
NONHDLC SERPL-MCNC: 159 MG/DL (CALC)
POTASSIUM SERPL-SCNC: 4.9 MMOL/L (ref 3.5–5.3)
PROT SERPL-MCNC: 6.6 G/DL (ref 6.1–8.1)
QUEST EGFR AFRICAN AMERICAN: 117 ML/MIN/1.73M2
QUEST EGFR NON-AFR. AMERICAN: 101 ML/MIN/1.73M2
SODIUM SERPL-SCNC: 144 MMOL/L (ref 135–146)
TRIGL SERPL-MCNC: 98 MG/DL

## 2022-06-10 ENCOUNTER — APPOINTMENT (OUTPATIENT)
Dept: URBAN - METROPOLITAN AREA CLINIC 200 | Age: 62
Setting detail: DERMATOLOGY
End: 2022-06-13

## 2022-06-10 DIAGNOSIS — L98.8 OTHER SPECIFIED DISORDERS OF THE SKIN AND SUBCUTANEOUS TISSUE: ICD-10-CM

## 2022-06-10 DIAGNOSIS — Z11.52 ENCOUNTER FOR SCREENING FOR COVID-19: ICD-10-CM

## 2022-06-10 PROCEDURE — OTHER SCREENING FOR COVID-19: OTHER

## 2022-06-10 PROCEDURE — OTHER BOTOX (U OR CC) ADDITIVE: OTHER

## 2022-06-10 PROCEDURE — OTHER BOTOX (U OR CC): OTHER

## 2022-06-10 ASSESSMENT — LOCATION DETAILED DESCRIPTION DERM
LOCATION DETAILED: RIGHT INFERIOR MEDIAL FOREHEAD
LOCATION DETAILED: LEFT FOREHEAD
LOCATION DETAILED: EPIGASTRIC SKIN
LOCATION DETAILED: LEFT MEDIAL FOREHEAD
LOCATION DETAILED: RIGHT SUPERIOR FOREHEAD
LOCATION DETAILED: GLABELLA
LOCATION DETAILED: LEFT INFERIOR FOREHEAD
LOCATION DETAILED: LEFT SUPERIOR FOREHEAD
LOCATION DETAILED: RIGHT FOREHEAD
LOCATION DETAILED: INFERIOR MID FOREHEAD

## 2022-06-10 ASSESSMENT — LOCATION SIMPLE DESCRIPTION DERM
LOCATION SIMPLE: RIGHT FOREHEAD
LOCATION SIMPLE: ABDOMEN
LOCATION SIMPLE: GLABELLA
LOCATION SIMPLE: INFERIOR FOREHEAD
LOCATION SIMPLE: LEFT FOREHEAD

## 2022-06-10 ASSESSMENT — LOCATION ZONE DERM
LOCATION ZONE: FACE
LOCATION ZONE: TRUNK

## 2022-06-10 NOTE — PROCEDURE: BOTOX (U OR CC)
Detail Level: Detailed
Show Inventory Tab: Show
Consent: Written consent obtained. Risks include but not limited to lid/brow ptosis, bruising, swelling, diplopia, temporary effect, incomplete chemical denervation.
Measure In Units Or Cc's?: units
Post-Care Instructions: Patient instructed to not lie down for 4 hours and limit physical activity for 24 hours.
Dilution (U/0.1 Cc): 4
Quantity Per Injection Site (Units): 5
Quantity Per Injection Site (Units): 1

## 2022-06-13 NOTE — ASSESSMENT & PLAN NOTE
06/13/22 1412   IMM Letter   IMM Letter given to Patient/Family/Significant other/Guardian/POA/by: initial IM given on 6/11 Georgina Burt RN   IMM Letter date given: 06/13/22   IMM Letter time given: 1412     Georgina Burt RN, BSN,   [phone]  Electronically signed by Georgina Burt RN on 6/13/2022 at 2:12 PM Suspect RTC tear  Xray, then MRI  Then f/u ortho

## 2022-08-26 ENCOUNTER — APPOINTMENT (OUTPATIENT)
Dept: URBAN - METROPOLITAN AREA CLINIC 203 | Age: 62
Setting detail: DERMATOLOGY
End: 2022-08-26

## 2022-08-26 DIAGNOSIS — Z11.52 ENCOUNTER FOR SCREENING FOR COVID-19: ICD-10-CM

## 2022-08-26 DIAGNOSIS — L81.4 OTHER MELANIN HYPERPIGMENTATION: ICD-10-CM

## 2022-08-26 DIAGNOSIS — L98.8 OTHER SPECIFIED DISORDERS OF THE SKIN AND SUBCUTANEOUS TISSUE: ICD-10-CM

## 2022-08-26 PROCEDURE — OTHER BOTOX (U OR CC) ADDITIVE: OTHER

## 2022-08-26 PROCEDURE — OTHER SCREENING FOR COVID-19: OTHER

## 2022-08-26 PROCEDURE — OTHER BOTOX (U OR CC): OTHER

## 2022-08-26 PROCEDURE — OTHER PHOTO-DOCUMENTATION: OTHER

## 2022-08-26 PROCEDURE — 99212 OFFICE O/P EST SF 10 MIN: CPT

## 2022-08-26 ASSESSMENT — LOCATION DETAILED DESCRIPTION DERM
LOCATION DETAILED: RIGHT FOREHEAD
LOCATION DETAILED: LEFT INFERIOR VERMILION LIP
LOCATION DETAILED: INFERIOR MID FOREHEAD
LOCATION DETAILED: LEFT FOREHEAD
LOCATION DETAILED: LEFT INFERIOR VERMILION LIP
LOCATION DETAILED: RIGHT INFERIOR MEDIAL FOREHEAD
LOCATION DETAILED: LEFT INFERIOR FOREHEAD
LOCATION DETAILED: RIGHT SUPERIOR FOREHEAD
LOCATION DETAILED: LEFT MEDIAL FOREHEAD
LOCATION DETAILED: EPIGASTRIC SKIN
LOCATION DETAILED: LEFT SUPERIOR FOREHEAD
LOCATION DETAILED: GLABELLA

## 2022-08-26 ASSESSMENT — LOCATION SIMPLE DESCRIPTION DERM
LOCATION SIMPLE: LEFT LIP
LOCATION SIMPLE: ABDOMEN
LOCATION SIMPLE: INFERIOR FOREHEAD
LOCATION SIMPLE: LEFT LIP
LOCATION SIMPLE: LEFT FOREHEAD
LOCATION SIMPLE: RIGHT FOREHEAD
LOCATION SIMPLE: GLABELLA

## 2022-08-26 ASSESSMENT — LOCATION ZONE DERM
LOCATION ZONE: FACE
LOCATION ZONE: LIP
LOCATION ZONE: TRUNK
LOCATION ZONE: LIP

## 2022-08-26 NOTE — PROCEDURE: BOTOX (U OR CC)
Detail Level: Detailed
Show Inventory Tab: Show
Consent: Written consent obtained. Risks include but not limited to lid/brow ptosis, bruising, swelling, diplopia, temporary effect, incomplete chemical denervation.
Measure In Units Or Cc's?: units
Post-Care Instructions: Patient instructed to not lie down for 4 hours and limit physical activity for 24 hours.
Dilution (U/0.1 Cc): 4
Quantity Per Injection Site (Units): 1
Quantity Per Injection Site (Units): 2

## 2022-10-25 ENCOUNTER — TELEMEDICINE (OUTPATIENT)
Dept: SURGERY | Facility: CLINIC | Age: 62
End: 2022-10-25
Payer: COMMERCIAL

## 2022-10-25 VITALS — WEIGHT: 148 LBS | BODY MASS INDEX: 24.66 KG/M2 | HEIGHT: 65 IN

## 2022-10-25 DIAGNOSIS — Z12.11 ENCOUNTER FOR SCREENING COLONOSCOPY FOR NON-HIGH-RISK PATIENT: Primary | ICD-10-CM

## 2022-10-25 PROCEDURE — 3008F BODY MASS INDEX DOCD: CPT | Performed by: SURGERY

## 2022-10-25 PROCEDURE — 99212 OFFICE O/P EST SF 10 MIN: CPT | Mod: 95 | Performed by: SURGERY

## 2022-10-25 ASSESSMENT — ENCOUNTER SYMPTOMS
UNEXPECTED WEIGHT CHANGE: 0
PALPITATIONS: 0
BLOOD IN STOOL: 0
COUGH: 0
ABDOMINAL PAIN: 0
CONSTIPATION: 0
ABDOMINAL DISTENTION: 0
SHORTNESS OF BREATH: 0
DIARRHEA: 0
WHEEZING: 0
ACTIVITY CHANGE: 0
CHEST TIGHTNESS: 0
APPETITE CHANGE: 0
BRUISES/BLEEDS EASILY: 0

## 2022-10-25 NOTE — PROGRESS NOTES
Verification of Patient Location:  The patient affirms they are currently located in the following state: [place]    Request for Consent:    Audio and Video Encounter   Hello, my name is Buddy Pastor MD.  Before we proceed, can you please verify your identification by telling me your full name and date of birth?  Can you tell me who is in the room with you?    You and I are about to have a telemedicine check-in or visit because you have requested it.  This is a live video-conference.  I am a real person, speaking to you in real time.  There is no one else with me on the video-conference.  However, when we use ([employer], [employer], etc) it is important for you to know that the video-conference may not be secure or private.  I am not recording this conversation and I am asking you not to record it.  This telemedicine visit will be billed to your health insurance or you, if you are self-insured.  You understand you will be responsible for any copayments or coinsurances that apply to your telemedicine visit.  Communication platform used for this encounter:  [employer] Video Visit ([employer] Video Client)     Before starting our telemedicine visit, I am required to get your consent for this virtual check-in or visit by telemedicine. Do you consent?      Patient Response to Request for Consent:  Yes      Visit Documentation:  Subjective     Patient ID: Francine Tuttle is a 62 y.o. female.  1960      Francine presents to the office to schedule screening colonoscopy.  She has no family history of colorectal cancer.Francine denies unexpected weight loss, poor appetite, abdominal pain, change in bowel habits, melena or blood per rectum.  She has no cough, wheezing or shortness of breath no chest pain on exertion or palpitations.  There is no history of bleeding disorder.       The following have been reviewed and updated as appropriate in this visit:   Tobacco  Allergies  Meds  Problems  Med Hx  Surg Hx  Fam Hx   Soc   Hx      Review of Systems   Constitutional: Negative for activity change, appetite change and unexpected weight change.   Respiratory: Negative for cough, chest tightness, shortness of breath and wheezing.    Cardiovascular: Negative for chest pain and palpitations.   Gastrointestinal: Negative for abdominal distention, abdominal pain, blood in stool, constipation and diarrhea.   Hematological: Does not bruise/bleed easily.         Assessment/Plan Screening colonoscopy.Francine Shah expressed understanding of the indications for the procedure, the procedure which is planned, the alternatives, including doing nothing, and the risks and complications of each.  I did answer all of her questions to her satisfaction.    Diagnoses and all orders for this visit:    Encounter for screening colonoscopy for non-high-risk patient (Primary)        Time Spent:  I spent 5 minutes on this date of service performing the following activities: obtaining history.

## 2022-11-15 DIAGNOSIS — Z12.31 SCREENING MAMMOGRAM FOR BREAST CANCER: Primary | ICD-10-CM

## 2022-11-18 ENCOUNTER — APPOINTMENT (OUTPATIENT)
Dept: URBAN - METROPOLITAN AREA CLINIC 203 | Age: 62
Setting detail: DERMATOLOGY
End: 2022-11-27

## 2022-11-18 DIAGNOSIS — L98.8 OTHER SPECIFIED DISORDERS OF THE SKIN AND SUBCUTANEOUS TISSUE: ICD-10-CM

## 2022-11-18 DIAGNOSIS — Z11.52 ENCOUNTER FOR SCREENING FOR COVID-19: ICD-10-CM

## 2022-11-18 PROCEDURE — OTHER MIPS QUALITY: OTHER

## 2022-11-18 PROCEDURE — OTHER BOTOX (U OR CC): OTHER

## 2022-11-18 PROCEDURE — OTHER SCREENING FOR COVID-19: OTHER

## 2022-11-18 PROCEDURE — OTHER BOTOX (U OR CC) ADDITIVE: OTHER

## 2022-11-18 ASSESSMENT — LOCATION SIMPLE DESCRIPTION DERM
LOCATION SIMPLE: LEFT FOREHEAD
LOCATION SIMPLE: ABDOMEN
LOCATION SIMPLE: RIGHT FOREHEAD
LOCATION SIMPLE: GLABELLA

## 2022-11-18 ASSESSMENT — LOCATION ZONE DERM
LOCATION ZONE: FACE
LOCATION ZONE: TRUNK

## 2022-11-18 ASSESSMENT — LOCATION DETAILED DESCRIPTION DERM
LOCATION DETAILED: LEFT INFERIOR MEDIAL FOREHEAD
LOCATION DETAILED: RIGHT SUPERIOR FOREHEAD
LOCATION DETAILED: EPIGASTRIC SKIN
LOCATION DETAILED: LEFT SUPERIOR FOREHEAD
LOCATION DETAILED: GLABELLA
LOCATION DETAILED: LEFT FOREHEAD
LOCATION DETAILED: RIGHT INFERIOR MEDIAL FOREHEAD
LOCATION DETAILED: RIGHT FOREHEAD

## 2022-11-18 NOTE — PROCEDURE: BOTOX (U OR CC)
Subjective  Chief Complaint   Patient presents with    6 Month Follow-Up     f/u for prozac, states that she does not feel that it is working for her.  Depression     failed depression screening. HPI    Pt here to follow up on depression and anxiety. She is seeing counseling at college at [facility]. She sees them every 3 weeks. Depression and anxiety have been worsening since she started college. She is very nonspecific regarding her symptoms; however per the depression screening her depression has worsened significantly. She also admits to thoughts of self harm and thoughts of being better off dead; however she adamantly denies and plans or intentions. Does not feel prozac is working for her anymore. She is scheduled to see her counselor in 2 weeks at school. She is very close with her roommate at [facility] and reports she is aware of everything she has going on. Past Medical History:   Diagnosis Date    Anxiety     Gastritis      There are no active problems to display for this patient. Past Surgical History:   Procedure Laterality Date    UPPER GASTROINTESTINAL ENDOSCOPY  2018     No family history on file.   Social History     Socioeconomic History    Marital status: Single     Spouse name: None    Number of children: None    Years of education: None    Highest education level: None   Occupational History    None   Social Needs    Financial resource strain: Not hard at all   [employer] insecurity:     Worry: Never true     Inability: Never true   [employer] needs:     Medical: No     Non-medical: No   Tobacco Use    Smoking status: Never Smoker    Smokeless tobacco: Never Used   Substance and Sexual Activity    Alcohol use: No    Drug use: No    Sexual activity: Never   Lifestyle    Physical activity:     Days per week: None     Minutes per session: None    Stress: None   Relationships    Social connections:     Talks on phone: None     Gets together: None     Attends
Mandaen service: None     Active member of club or organization: None     Attends meetings of clubs or organizations: None     Relationship status: None    Intimate partner violence:     Fear of current or ex partner: None     Emotionally abused: None     Physically abused: None     Forced sexual activity: None   Other Topics Concern    None   Social History Narrative    None     Current Outpatient Medications on File Prior to Visit   Medication Sig Dispense Refill    albuterol sulfate (PROAIR RESPICLICK) 340 (90 Base) MCG/ACT aerosol powder inhalation Inhale 2 puffs into the lungs every 6 hours as needed for Wheezing or Shortness of Breath 1 Inhaler 2    FLUoxetine (PROZAC) 20 MG tablet       drospirenone-ethinyl estradiol (NICHOL) 3-0.02 MG per tablet TAKE ONE TABLET BY MOUTH EVERY DAY  12     No current facility-administered medications on file prior to visit. No Known Allergies    Review of Systems   Constitutional: Negative for chills, diaphoresis, fatigue and fever. HENT: Negative for congestion. Respiratory: Negative for cough and shortness of breath. Cardiovascular: Negative for chest pain, palpitations and leg swelling. Neurological: Negative for dizziness and headaches. Psychiatric/Behavioral: Positive for dysphoric mood. Negative for confusion, decreased concentration, self-injury, sleep disturbance and suicidal ideas. The patient is nervous/anxious. The patient is not hyperactive. Objective  Vitals:    01/21/20 0901   BP: 100/72   Site: Right Upper Arm   Position: Sitting   Cuff Size: Medium Adult   Pulse: 90   Temp: 98 °F (36.7 °C)   TempSrc: Temporal   SpO2: 99%   Weight: 141 lb (64 kg)   Height: 5' 4\" (1.626 m)     Physical Exam  Constitutional:       General: She is not in acute distress. Appearance: Normal appearance. She is normal weight. She is not ill-appearing, toxic-appearing or diaphoretic. HENT:      Head: Normocephalic.       Right Ear: External ear normal.
Left Ear: External ear normal.   Cardiovascular:      Rate and Rhythm: Normal rate and regular rhythm. Pulses: Normal pulses. Heart sounds: Normal heart sounds. No murmur. Pulmonary:      Effort: Pulmonary effort is normal. No respiratory distress. Breath sounds: Normal breath sounds. No stridor. No wheezing, rhonchi or rales. Chest:      Chest wall: No tenderness. Musculoskeletal: Normal range of motion. Right lower leg: No edema. Left lower leg: No edema. Skin:     General: Skin is warm and dry. Capillary Refill: Capillary refill takes less than 2 seconds. Coloration: Skin is not jaundiced or pale. Findings: No bruising, erythema, lesion or rash. Neurological:      General: No focal deficit present. Mental Status: She is alert and oriented to person, place, and time. Mental status is at baseline. Psychiatric:         Mood and Affect: Mood normal.         Behavior: Behavior normal.         Thought Content: Thought content normal.         Judgment: Judgment normal.         Assessment& Plan    1. Depression with anxiety  Stop prozac. Start zoloft. Continue to follow with counseling. Pt mother in visit today and aware of medication adjustments. Pt will also be open with her roommate regarding everything going on with the medication adjustments so she will be able to work with her on this as well and monitor for her safety . F/u in 2 weeks to recheck or sooner PRN. - sertraline (ZOLOFT) 50 MG tablet; Take 1 tablet by mouth daily  Dispense: 30 tablet; Refill: 1    Side effects, adverse effects of the medication prescribed today, as well as treatment plan/ rationale and result expectations have been discussed with the patient who expresses understanding and desires to proceed. Close follow up to evaluate treatment results and for coordination of care.   I have reviewed the patient's medical history in detail and updated the computerized patient
Measure In Units Or Cc's?: units
Consent: Written consent obtained. Risks include but not limited to lid/brow ptosis, bruising, swelling, diplopia, temporary effect, incomplete chemical denervation.
Dilution (U/0.1 Cc): 4
Show Inventory Tab: Show
Detail Level: Detailed
Post-Care Instructions: Patient instructed to not lie down for 4 hours and limit physical activity for 24 hours.
Quantity Per Injection Site (Units): 2
Quantity Per Injection Site (Units): 1

## 2022-12-02 ENCOUNTER — HOSPITAL ENCOUNTER (OUTPATIENT)
Dept: RADIOLOGY | Age: 62
Discharge: HOME | End: 2022-12-02
Attending: FAMILY MEDICINE
Payer: COMMERCIAL

## 2022-12-02 DIAGNOSIS — Z12.31 SCREENING MAMMOGRAM FOR BREAST CANCER: ICD-10-CM

## 2022-12-02 PROCEDURE — 77063 BREAST TOMOSYNTHESIS BI: CPT

## 2022-12-22 ENCOUNTER — TELEPHONE (OUTPATIENT)
Dept: SURGERY | Facility: CLINIC | Age: 62
End: 2022-12-22
Payer: COMMERCIAL

## 2022-12-22 NOTE — TELEPHONE ENCOUNTER
Called Alyssa to confirm her upcoming colonoscopy that is set for Thursday 12/29 , check to see if she has any questions.Alyssa did confirm and has no questions at this time.

## 2022-12-29 PROCEDURE — 45378 DIAGNOSTIC COLONOSCOPY: CPT | Performed by: SURGERY

## 2023-01-05 ENCOUNTER — TELEPHONE (OUTPATIENT)
Dept: PRIMARY CARE | Facility: CLINIC | Age: 63
End: 2023-01-05
Payer: COMMERCIAL

## 2023-01-05 NOTE — TELEPHONE ENCOUNTER
Pt called regarding her 01/13/2023 appt with Dr. Auguste for a physical.  She would like to have her gyn exam at the same time.  I did try reaching back office to confirm this would be ok but there was no answer.    If it is not ok please all pt back otherwise no need to call her back.

## 2023-01-12 ENCOUNTER — OFFICE VISIT (OUTPATIENT)
Dept: PRIMARY CARE | Facility: CLINIC | Age: 63
End: 2023-01-12
Payer: COMMERCIAL

## 2023-01-12 VITALS
BODY MASS INDEX: 25.06 KG/M2 | SYSTOLIC BLOOD PRESSURE: 127 MMHG | OXYGEN SATURATION: 96 % | DIASTOLIC BLOOD PRESSURE: 84 MMHG | HEART RATE: 76 BPM | TEMPERATURE: 97.9 F | HEIGHT: 64 IN | WEIGHT: 146.8 LBS

## 2023-01-12 DIAGNOSIS — I10 ESSENTIAL HYPERTENSION: Chronic | ICD-10-CM

## 2023-01-12 DIAGNOSIS — Z00.00 ENCOUNTER FOR GENERAL ADULT MEDICAL EXAMINATION WITHOUT ABNORMAL FINDINGS: Primary | ICD-10-CM

## 2023-01-12 DIAGNOSIS — Z23 NEED FOR IMMUNIZATION AGAINST PERTUSSIS: ICD-10-CM

## 2023-01-12 DIAGNOSIS — F33.41 RECURRENT MAJOR DEPRESSIVE DISORDER, IN PARTIAL REMISSION (CMS/HCC): Chronic | ICD-10-CM

## 2023-01-12 PROBLEM — Z12.11 ENCOUNTER FOR SCREENING COLONOSCOPY FOR NON-HIGH-RISK PATIENT: Status: RESOLVED | Noted: 2021-12-13 | Resolved: 2023-01-12

## 2023-01-12 PROCEDURE — 3008F BODY MASS INDEX DOCD: CPT | Performed by: FAMILY MEDICINE

## 2023-01-12 PROCEDURE — 90715 TDAP VACCINE 7 YRS/> IM: CPT | Performed by: FAMILY MEDICINE

## 2023-01-12 PROCEDURE — 99396 PREV VISIT EST AGE 40-64: CPT | Mod: 25 | Performed by: FAMILY MEDICINE

## 2023-01-12 PROCEDURE — 3074F SYST BP LT 130 MM HG: CPT | Performed by: FAMILY MEDICINE

## 2023-01-12 PROCEDURE — 3079F DIAST BP 80-89 MM HG: CPT | Performed by: FAMILY MEDICINE

## 2023-01-12 PROCEDURE — 90471 IMMUNIZATION ADMIN: CPT | Performed by: FAMILY MEDICINE

## 2023-01-12 RX ORDER — LISINOPRIL AND HYDROCHLOROTHIAZIDE 10; 12.5 MG/1; MG/1
1 TABLET ORAL DAILY
Qty: 90 TABLET | Refills: 3 | Status: SHIPPED | OUTPATIENT
Start: 2023-01-12 | End: 2023-08-22 | Stop reason: SDUPTHER

## 2023-01-12 RX ORDER — TRAZODONE HYDROCHLORIDE 50 MG/1
50 TABLET ORAL NIGHTLY
Qty: 90 TABLET | Refills: 3 | Status: SHIPPED | OUTPATIENT
Start: 2023-01-12 | End: 2024-01-15 | Stop reason: ALTCHOICE

## 2023-01-12 ASSESSMENT — ENCOUNTER SYMPTOMS
UNEXPECTED WEIGHT CHANGE: 0
ACTIVITY CHANGE: 0
ABDOMINAL PAIN: 0
DIARRHEA: 0
DIFFICULTY URINATING: 0
CONSTIPATION: 0

## 2023-01-12 NOTE — PROGRESS NOTES
"      Subjective      Patient ID: Francine Tuttle is a 62 y.o. female.  1960      Well visit        The following have been reviewed and updated as appropriate in this visit:   Tobacco  Allergies  Meds  Problems  Med Hx  Surg Hx  Fam Hx       Review of Systems   Constitutional: Negative for activity change and unexpected weight change.   Cardiovascular: Negative for leg swelling.   Gastrointestinal: Negative for abdominal pain, constipation and diarrhea.   Genitourinary: Negative for difficulty urinating.   All other systems reviewed and are negative.      Objective     Vitals:    01/12/23 1303   BP: 127/84   BP Location: Right upper arm   Patient Position: Sitting   Pulse: 76   Temp: 36.6 °C (97.9 °F)   TempSrc: Temporal   SpO2: 96%   Weight: 66.6 kg (146 lb 12.8 oz)   Height: 1.626 m (5' 4\")     Body mass index is 25.2 kg/m².    Physical Exam  Vitals and nursing note reviewed.   Constitutional:       General: She is not in acute distress.     Appearance: Normal appearance. She is well-developed. She is not ill-appearing, toxic-appearing or diaphoretic.   HENT:      Head: Normocephalic and atraumatic.      Right Ear: Tympanic membrane, ear canal and external ear normal. There is no impacted cerumen.      Left Ear: Tympanic membrane, ear canal and external ear normal. There is no impacted cerumen.   Eyes:      General: No scleral icterus.        Right eye: No discharge.         Left eye: No discharge.      Conjunctiva/sclera: Conjunctivae normal.   Neck:      Thyroid: No thyromegaly.      Vascular: No JVD.   Cardiovascular:      Rate and Rhythm: Normal rate and regular rhythm.      Heart sounds: No murmur heard.    No gallop.   Pulmonary:      Effort: Pulmonary effort is normal. No respiratory distress.      Breath sounds: No wheezing or rhonchi.   Abdominal:      General: There is no distension.      Palpations: Abdomen is soft. There is no mass.      Tenderness: There is no abdominal tenderness. " There is no guarding or rebound.   Musculoskeletal:      Cervical back: Neck supple. No rigidity or tenderness.      Right lower leg: No edema.      Left lower leg: No edema.   Lymphadenopathy:      Cervical: No cervical adenopathy.   Skin:     General: Skin is warm and dry.      Coloration: Skin is not jaundiced or pale.   Neurological:      General: No focal deficit present.      Mental Status: She is alert and oriented to person, place, and time. Mental status is at baseline.      Deep Tendon Reflexes: Reflexes normal.   Psychiatric:         Mood and Affect: Mood normal.         Behavior: Behavior normal.         Thought Content: Thought content normal.         Judgment: Judgment normal.         Assessment/Plan   Diagnoses and all orders for this visit:    Encounter for general adult medical examination without abnormal findings (Primary)  Assessment & Plan:  Age appropriate AG provided to pt  shingrix 2 due at [facility]  Pap, mammo, cscope are utd  Check fast labs  tdap given today  Trial of traozodne for situational insomnia.   resee one year.        Recurrent major depressive disorder, in partial remission (CMS/HCC)    Essential hypertension  -     Comprehensive metabolic panel; Future  -     Lipid panel; Future  -     lisinopriL-hydrochlorothiazide (PRINZIDE,ZESTORETIC) 10-12.5 mg per tablet; Take 1 tablet by mouth daily.    Need for immunization against pertussis  -     Tdap vaccine greater than or equal to 6yo IM    Other orders  -     traZODone (DESYREL) 50 mg tablet; Take 1 tablet (50 mg total) by mouth nightly.

## 2023-01-12 NOTE — ASSESSMENT & PLAN NOTE
Age appropriate AG provided to pt  [facility]  Pap, mammo, cscope are utd  Check fast labs  tdap given today  Trial of traozodne for situational insomnia.   resee one year.

## 2023-01-27 DIAGNOSIS — E87.5 HYPERKALEMIA: Primary | ICD-10-CM

## 2023-01-27 LAB
ALBUMIN SERPL-MCNC: 4.4 G/DL (ref 3.6–5.1)
ALBUMIN/GLOB SERPL: 2.1 (CALC) (ref 1–2.5)
ALP SERPL-CCNC: 52 U/L (ref 37–153)
ALT SERPL-CCNC: 20 U/L (ref 6–29)
AST SERPL-CCNC: 20 U/L (ref 10–35)
BILIRUB SERPL-MCNC: 0.5 MG/DL (ref 0.2–1.2)
BUN SERPL-MCNC: 19 MG/DL (ref 7–25)
BUN/CREAT SERPL: ABNORMAL (CALC) (ref 6–22)
CALCIUM SERPL-MCNC: 9.8 MG/DL (ref 8.6–10.4)
CHLORIDE SERPL-SCNC: 103 MMOL/L (ref 98–110)
CHOLEST SERPL-MCNC: 258 MG/DL
CHOLEST/HDLC SERPL: 2.9 (CALC)
CO2 SERPL-SCNC: 31 MMOL/L (ref 20–32)
CREAT SERPL-MCNC: 0.59 MG/DL (ref 0.5–1.05)
EGFRCR SERPLBLD CKD-EPI 2021: 103 ML/MIN/1.73M2
GLOBULIN SER CALC-MCNC: 2.1 G/DL (CALC) (ref 1.9–3.7)
GLUCOSE SERPL-MCNC: 92 MG/DL (ref 65–139)
HDLC SERPL-MCNC: 89 MG/DL
LDLC SERPL CALC-MCNC: 146 MG/DL (CALC)
NONHDLC SERPL-MCNC: 169 MG/DL (CALC)
POTASSIUM SERPL-SCNC: 5.5 MMOL/L (ref 3.5–5.3)
PROT SERPL-MCNC: 6.5 G/DL (ref 6.1–8.1)
SODIUM SERPL-SCNC: 141 MMOL/L (ref 135–146)
TRIGL SERPL-MCNC: 115 MG/DL

## 2023-02-13 ENCOUNTER — APPOINTMENT (OUTPATIENT)
Dept: URBAN - METROPOLITAN AREA CLINIC 203 | Age: 63
Setting detail: DERMATOLOGY
End: 2023-02-14

## 2023-02-13 DIAGNOSIS — Z85.828 PERSONAL HISTORY OF OTHER MALIGNANT NEOPLASM OF SKIN: ICD-10-CM

## 2023-02-13 DIAGNOSIS — L98.8 OTHER SPECIFIED DISORDERS OF THE SKIN AND SUBCUTANEOUS TISSUE: ICD-10-CM

## 2023-02-13 DIAGNOSIS — L57.0 ACTINIC KERATOSIS: ICD-10-CM

## 2023-02-13 DIAGNOSIS — L20.84 INTRINSIC (ALLERGIC) ECZEMA: ICD-10-CM

## 2023-02-13 DIAGNOSIS — L57.8 OTHER SKIN CHANGES DUE TO CHRONIC EXPOSURE TO NONIONIZING RADIATION: ICD-10-CM

## 2023-02-13 PROCEDURE — OTHER PRESCRIPTION: OTHER

## 2023-02-13 PROCEDURE — OTHER LIQUID NITROGEN: OTHER

## 2023-02-13 PROCEDURE — OTHER SUNSCREEN RECOMMENDATIONS: OTHER

## 2023-02-13 PROCEDURE — OTHER BOTOX (U OR CC) ADDITIVE: OTHER

## 2023-02-13 PROCEDURE — OTHER COUNSELING: OTHER

## 2023-02-13 PROCEDURE — 17000 DESTRUCT PREMALG LESION: CPT

## 2023-02-13 PROCEDURE — OTHER PRESCRIPTION MEDICATION MANAGEMENT: OTHER

## 2023-02-13 PROCEDURE — OTHER REASSURANCE: OTHER

## 2023-02-13 PROCEDURE — 99213 OFFICE O/P EST LOW 20 MIN: CPT | Mod: 25

## 2023-02-13 PROCEDURE — OTHER BOTOX (U OR CC): OTHER

## 2023-02-13 RX ORDER — TRIAMCINOLONE ACETONIDE 1 MG/G
CREAM TOPICAL BID PRN
Qty: 80 | Refills: 5 | Status: ERX | COMMUNITY
Start: 2023-02-13

## 2023-02-13 ASSESSMENT — LOCATION DETAILED DESCRIPTION DERM
LOCATION DETAILED: RIGHT SUPERIOR FOREHEAD
LOCATION DETAILED: GLABELLA
LOCATION DETAILED: RIGHT MEDIAL POSTERIOR ANKLE
LOCATION DETAILED: RIGHT MEDIAL FOREHEAD
LOCATION DETAILED: RIGHT FOREHEAD
LOCATION DETAILED: LEFT SUPERIOR FOREHEAD
LOCATION DETAILED: LEFT MEDIAL FOREHEAD
LOCATION DETAILED: RIGHT INFERIOR FOREHEAD
LOCATION DETAILED: LEFT LATERAL FOREHEAD
LOCATION DETAILED: EPIGASTRIC SKIN
LOCATION DETAILED: LEFT MEDIAL BREAST 11-12:00 REGION
LOCATION DETAILED: LEFT FOREHEAD
LOCATION DETAILED: LEFT MEDIAL BREAST 10-11:00 REGION
LOCATION DETAILED: LEFT INFERIOR FOREHEAD

## 2023-02-13 ASSESSMENT — LOCATION SIMPLE DESCRIPTION DERM
LOCATION SIMPLE: LEFT BREAST
LOCATION SIMPLE: ABDOMEN
LOCATION SIMPLE: RIGHT FOREHEAD
LOCATION SIMPLE: LEFT FOREHEAD
LOCATION SIMPLE: RIGHT ANKLE
LOCATION SIMPLE: GLABELLA

## 2023-02-13 ASSESSMENT — LOCATION ZONE DERM
LOCATION ZONE: TRUNK
LOCATION ZONE: FACE
LOCATION ZONE: LEG

## 2023-02-13 ASSESSMENT — PAIN INTENSITY VAS: HOW INTENSE IS YOUR PAIN 0 BEING NO PAIN, 10 BEING THE MOST SEVERE PAIN POSSIBLE?: NO PAIN

## 2023-02-13 NOTE — PROCEDURE: BOTOX (U OR CC)
Dilution (U/0.1 Cc): 4
Show Inventory Tab: Show
Consent: Written consent obtained. Risks include but not limited to lid/brow ptosis, bruising, swelling, diplopia, temporary effect, incomplete chemical denervation.
Detail Level: Detailed
Reconstitution Date (Optional): 300.00
Post-Care Instructions: Patient instructed to not lie down for 4 hours and limit physical activity for 24 hours.
Measure In Units Or Cc's?: units
Quantity Per Injection Site (Units): 1

## 2023-02-13 NOTE — PROCEDURE: LIQUID NITROGEN
Show Applicator Variable?: Yes
Duration Of Freeze Thaw-Cycle (Seconds): 0
Application Tool (Optional): Liquid Nitrogen Sprayer
Render Note In Bullet Format When Appropriate: No
Post-Care Instructions: I reviewed with the patient in detail post-care instructions. Patient is to wear sunprotection, and avoid picking at any of the treated lesions. Pt may apply Vaseline to crusted or scabbing areas.
Consent: The patient's consent was obtained including but not limited to risks of crusting, scabbing, blistering, scarring, darker or lighter pigmentary change, recurrence, incomplete removal and infection.
Detail Level: Detailed

## 2023-05-10 ENCOUNTER — APPOINTMENT (OUTPATIENT)
Dept: URBAN - METROPOLITAN AREA CLINIC 203 | Age: 63
Setting detail: DERMATOLOGY
End: 2023-05-16

## 2023-05-10 DIAGNOSIS — L98.8 OTHER SPECIFIED DISORDERS OF THE SKIN AND SUBCUTANEOUS TISSUE: ICD-10-CM

## 2023-05-10 PROCEDURE — OTHER BOTOX (U OR CC) ADDITIVE: OTHER

## 2023-05-10 PROCEDURE — OTHER BOTOX (U OR CC): OTHER

## 2023-05-10 ASSESSMENT — LOCATION DETAILED DESCRIPTION DERM
LOCATION DETAILED: LEFT SUPERIOR FOREHEAD
LOCATION DETAILED: RIGHT INFERIOR FOREHEAD
LOCATION DETAILED: LEFT INFERIOR FOREHEAD
LOCATION DETAILED: GLABELLA
LOCATION DETAILED: RIGHT FOREHEAD
LOCATION DETAILED: LEFT MEDIAL FOREHEAD
LOCATION DETAILED: RIGHT SUPERIOR FOREHEAD
LOCATION DETAILED: LEFT FOREHEAD

## 2023-05-10 ASSESSMENT — LOCATION SIMPLE DESCRIPTION DERM
LOCATION SIMPLE: RIGHT FOREHEAD
LOCATION SIMPLE: GLABELLA
LOCATION SIMPLE: LEFT FOREHEAD

## 2023-05-10 ASSESSMENT — LOCATION ZONE DERM: LOCATION ZONE: FACE

## 2023-05-10 NOTE — PROCEDURE: BOTOX (U OR CC)
Post-Care Instructions: Patient instructed to not lie down for 4 hours and limit physical activity for 24 hours.
Measure In Units Or Cc's?: units
Reconstitution Date (Optional): 20 units = 300.00
Show Inventory Tab: Show
Consent: Written consent obtained. Risks include but not limited to lid/brow ptosis, bruising, swelling, diplopia, temporary effect, incomplete chemical denervation.
Dilution (U/0.1 Cc): 4
Detail Level: Detailed
Quantity Per Injection Site (Units): 1

## 2023-06-18 LAB — POTASSIUM SERPL-SCNC: 4.7 MMOL/L (ref 3.5–5.3)

## 2023-06-27 ENCOUNTER — TELEPHONE (OUTPATIENT)
Dept: PRIMARY CARE | Facility: CLINIC | Age: 63
End: 2023-06-27
Payer: COMMERCIAL

## 2023-06-27 RX ORDER — AMOXICILLIN 500 MG/1
500 TABLET, FILM COATED ORAL 3 TIMES DAILY
Qty: 30 TABLET | Refills: 0 | Status: SHIPPED | OUTPATIENT
Start: 2023-06-27 | End: 2023-07-07

## 2023-06-27 NOTE — TELEPHONE ENCOUNTER
Patient having symptoms, periodically, 2x a week, drinks cranberry juice and lots and lots of water but says something still is not right    Would like the antibiotic prescribed

## 2023-06-27 NOTE — TELEPHONE ENCOUNTER
Pt didn't read PM sent by CM 6/22 --     How are you feeling? Any urinary tract infection symptoms?        Per CM -- Culture not totally consistent with UTI but mild pos so I rec abx if she is actively having symptoms.           LM for pt to call the office.

## 2023-08-10 ENCOUNTER — APPOINTMENT (OUTPATIENT)
Dept: URBAN - METROPOLITAN AREA CLINIC 203 | Age: 63
Setting detail: DERMATOLOGY
End: 2023-08-10

## 2023-08-10 DIAGNOSIS — L98.8 OTHER SPECIFIED DISORDERS OF THE SKIN AND SUBCUTANEOUS TISSUE: ICD-10-CM

## 2023-08-10 PROCEDURE — OTHER BOTOX (U OR CC) ADDITIVE: OTHER

## 2023-08-10 PROCEDURE — OTHER BOTOX (U OR CC): OTHER

## 2023-08-10 ASSESSMENT — LOCATION DETAILED DESCRIPTION DERM
LOCATION DETAILED: RIGHT INFERIOR MEDIAL FOREHEAD
LOCATION DETAILED: LEFT FOREHEAD
LOCATION DETAILED: LEFT SUPERIOR MEDIAL FOREHEAD
LOCATION DETAILED: LEFT INFERIOR FOREHEAD
LOCATION DETAILED: RIGHT SUPERIOR FOREHEAD
LOCATION DETAILED: RIGHT LATERAL FOREHEAD
LOCATION DETAILED: GLABELLA
LOCATION DETAILED: RIGHT FOREHEAD
LOCATION DETAILED: LEFT MEDIAL FOREHEAD
LOCATION DETAILED: RIGHT INFERIOR FOREHEAD

## 2023-08-10 ASSESSMENT — LOCATION ZONE DERM: LOCATION ZONE: FACE

## 2023-08-10 ASSESSMENT — LOCATION SIMPLE DESCRIPTION DERM
LOCATION SIMPLE: LEFT FOREHEAD
LOCATION SIMPLE: RIGHT FOREHEAD
LOCATION SIMPLE: GLABELLA

## 2023-08-10 NOTE — PROCEDURE: BOTOX (U OR CC)
Post-Care Instructions: Patient instructed to not lie down for 4 hours and limit physical activity for 24 hours.
Measure In Units Or Cc's?: units
Consent: Written consent obtained. Risks include but not limited to lid/brow ptosis, bruising, swelling, diplopia, temporary effect, incomplete chemical denervation.
Show Inventory Tab: Show
Detail Level: Detailed
Dilution (U/0.1 Cc): 4
Quantity Per Injection Site (Units): 1

## 2023-11-13 ENCOUNTER — APPOINTMENT (OUTPATIENT)
Dept: URBAN - METROPOLITAN AREA CLINIC 203 | Age: 63
Setting detail: DERMATOLOGY
End: 2023-11-20

## 2023-11-13 DIAGNOSIS — L98.8 OTHER SPECIFIED DISORDERS OF THE SKIN AND SUBCUTANEOUS TISSUE: ICD-10-CM

## 2023-11-13 PROCEDURE — OTHER PRESCRIPTION MEDICATION MANAGEMENT: OTHER

## 2023-11-13 PROCEDURE — OTHER BOTOX (U OR CC) ADDITIVE: OTHER

## 2023-11-13 PROCEDURE — OTHER BOTOX (U OR CC): OTHER

## 2023-11-13 ASSESSMENT — LOCATION DETAILED DESCRIPTION DERM
LOCATION DETAILED: RIGHT FOREHEAD
LOCATION DETAILED: NASAL DORSUM
LOCATION DETAILED: LEFT FOREHEAD
LOCATION DETAILED: GLABELLA
LOCATION DETAILED: RIGHT INFERIOR FOREHEAD
LOCATION DETAILED: RIGHT SUPERIOR FOREHEAD
LOCATION DETAILED: LEFT CENTRAL EYEBROW
LOCATION DETAILED: RIGHT MEDIAL FOREHEAD
LOCATION DETAILED: LEFT LATERAL FOREHEAD
LOCATION DETAILED: RIGHT SUPERIOR MEDIAL FOREHEAD

## 2023-11-13 ASSESSMENT — LOCATION SIMPLE DESCRIPTION DERM
LOCATION SIMPLE: GLABELLA
LOCATION SIMPLE: RIGHT FOREHEAD
LOCATION SIMPLE: NOSE
LOCATION SIMPLE: LEFT FOREHEAD
LOCATION SIMPLE: LEFT EYEBROW

## 2023-11-13 ASSESSMENT — LOCATION ZONE DERM
LOCATION ZONE: FACE
LOCATION ZONE: NOSE

## 2023-11-13 NOTE — PROCEDURE: BOTOX (U OR CC)
Measure In Units Or Cc's?: units
Post-Care Instructions: Patient instructed to not lie down for 4 hours and limit physical activity for 24 hours.
Show Inventory Tab: Show
Dilution (U/0.1 Cc): 4
Detail Level: Detailed
Consent: Written consent obtained. Risks include but not limited to lid/brow ptosis, bruising, swelling, diplopia, temporary effect, incomplete chemical denervation.
Quantity Per Injection Site (Units): 2
Quantity Per Injection Site (Units): 1

## 2023-11-13 NOTE — PROCEDURE: PRESCRIPTION MEDICATION MANAGEMENT
Detail Level: Zone
Render In Strict Bullet Format?: No
Initiate Treatment: Retinal rapid wrinkle repair

## 2023-12-01 DIAGNOSIS — I10 ESSENTIAL HYPERTENSION: Primary | Chronic | ICD-10-CM

## 2023-12-01 DIAGNOSIS — E78.00 PURE HYPERCHOLESTEROLEMIA: ICD-10-CM

## 2023-12-05 ENCOUNTER — HOSPITAL ENCOUNTER (OUTPATIENT)
Dept: RADIOLOGY | Age: 63
Discharge: HOME | End: 2023-12-05
Attending: FAMILY MEDICINE
Payer: COMMERCIAL

## 2023-12-05 DIAGNOSIS — Z12.31 SCREENING MAMMOGRAM FOR BREAST CANCER: ICD-10-CM

## 2023-12-05 PROCEDURE — 77067 SCR MAMMO BI INCL CAD: CPT

## 2024-01-09 LAB
ALBUMIN SERPL-MCNC: 4.4 G/DL (ref 3.6–5.1)
ALBUMIN/GLOB SERPL: 2.1 (CALC) (ref 1–2.5)
ALP SERPL-CCNC: 53 U/L (ref 37–153)
ALT SERPL-CCNC: 19 U/L (ref 6–29)
AST SERPL-CCNC: 18 U/L (ref 10–35)
BILIRUB SERPL-MCNC: 0.4 MG/DL (ref 0.2–1.2)
BUN SERPL-MCNC: 16 MG/DL (ref 7–25)
BUN/CREAT SERPL: NORMAL (CALC) (ref 6–22)
CALCIUM SERPL-MCNC: 9.4 MG/DL (ref 8.6–10.4)
CHLORIDE SERPL-SCNC: 105 MMOL/L (ref 98–110)
CHOLEST SERPL-MCNC: 268 MG/DL
CHOLEST/HDLC SERPL: 2.9 (CALC)
CO2 SERPL-SCNC: 31 MMOL/L (ref 20–32)
CREAT SERPL-MCNC: 0.61 MG/DL (ref 0.5–1.05)
EGFRCR SERPLBLD CKD-EPI 2021: 102 ML/MIN/1.73M2
GLOBULIN SER CALC-MCNC: 2.1 G/DL (CALC) (ref 1.9–3.7)
GLUCOSE SERPL-MCNC: 83 MG/DL (ref 65–99)
HDLC SERPL-MCNC: 91 MG/DL
LDLC SERPL CALC-MCNC: 157 MG/DL (CALC)
NONHDLC SERPL-MCNC: 177 MG/DL (CALC)
POTASSIUM SERPL-SCNC: 4.6 MMOL/L (ref 3.5–5.3)
PROT SERPL-MCNC: 6.5 G/DL (ref 6.1–8.1)
SODIUM SERPL-SCNC: 142 MMOL/L (ref 135–146)
TRIGL SERPL-MCNC: 95 MG/DL

## 2024-01-14 PROBLEM — M62.838 CERVICAL PARASPINAL MUSCLE SPASM: Status: RESOLVED | Noted: 2022-03-29 | Resolved: 2024-01-14

## 2024-01-15 ENCOUNTER — OFFICE VISIT (OUTPATIENT)
Dept: PRIMARY CARE | Facility: CLINIC | Age: 64
End: 2024-01-15
Payer: COMMERCIAL

## 2024-01-15 VITALS
DIASTOLIC BLOOD PRESSURE: 85 MMHG | WEIGHT: 143.4 LBS | HEART RATE: 79 BPM | OXYGEN SATURATION: 99 % | SYSTOLIC BLOOD PRESSURE: 130 MMHG | TEMPERATURE: 98 F | HEIGHT: 64 IN | BODY MASS INDEX: 24.48 KG/M2

## 2024-01-15 DIAGNOSIS — F33.41 RECURRENT MAJOR DEPRESSIVE DISORDER, IN PARTIAL REMISSION (CMS/HCC): Chronic | ICD-10-CM

## 2024-01-15 DIAGNOSIS — I10 ESSENTIAL HYPERTENSION: Chronic | ICD-10-CM

## 2024-01-15 DIAGNOSIS — Z29.11 NEED FOR RSV IMMUNIZATION: ICD-10-CM

## 2024-01-15 DIAGNOSIS — Z00.00 ENCOUNTER FOR GENERAL ADULT MEDICAL EXAMINATION WITHOUT ABNORMAL FINDINGS: Primary | ICD-10-CM

## 2024-01-15 PROBLEM — M16.0 PRIMARY OSTEOARTHRITIS OF BOTH HIPS: Status: ACTIVE | Noted: 2024-01-15

## 2024-01-15 PROCEDURE — 90679 RSV VACC PREF RECOMB ADJT IM: CPT | Performed by: FAMILY MEDICINE

## 2024-01-15 PROCEDURE — 99396 PREV VISIT EST AGE 40-64: CPT | Mod: 25 | Performed by: FAMILY MEDICINE

## 2024-01-15 PROCEDURE — 3008F BODY MASS INDEX DOCD: CPT | Performed by: FAMILY MEDICINE

## 2024-01-15 PROCEDURE — 90471 IMMUNIZATION ADMIN: CPT | Performed by: FAMILY MEDICINE

## 2024-01-15 RX ORDER — LISINOPRIL AND HYDROCHLOROTHIAZIDE 10; 12.5 MG/1; MG/1
0.5 TABLET ORAL DAILY
Qty: 90 TABLET | Refills: 3 | COMMUNITY
Start: 2024-01-15 | End: 2024-03-16 | Stop reason: SDUPTHER

## 2024-01-15 ASSESSMENT — ENCOUNTER SYMPTOMS
DIARRHEA: 0
DIFFICULTY URINATING: 0
SLEEP DISTURBANCE: 0
ABDOMINAL PAIN: 0
UNEXPECTED WEIGHT CHANGE: 0
CONSTIPATION: 0
ACTIVITY CHANGE: 1
APPETITE CHANGE: 1

## 2024-01-15 ASSESSMENT — PATIENT HEALTH QUESTIONNAIRE - PHQ9: SUM OF ALL RESPONSES TO PHQ9 QUESTIONS 1 & 2: 0

## 2024-01-15 NOTE — PROGRESS NOTES
"      Subjective      Patient ID: Francine Tuttle is a 63 y.o. female.  1960      Well visit        The following have been reviewed and updated as appropriate in this visit:   Tobacco  Allergies  Meds  Problems  Med Hx  Surg Hx  Fam Hx       Review of Systems   Constitutional: Positive for activity change and appetite change. Negative for unexpected weight change.   Cardiovascular: Negative for leg swelling.   Gastrointestinal: Negative for abdominal pain, constipation and diarrhea.   Genitourinary: Negative for difficulty urinating.   Psychiatric/Behavioral: Negative for sleep disturbance.   All other systems reviewed and are negative.      Objective     Vitals:    01/15/24 0837   BP: 130/85   BP Location: Right upper arm   Patient Position: Sitting   Pulse: 79   Temp: 36.7 °C (98 °F)   TempSrc: Temporal   SpO2: 99%   Weight: 65 kg (143 lb 6.4 oz)   Height: 1.613 m (5' 3.5\")     Body mass index is 25 kg/m².    Physical Exam  Vitals and nursing note reviewed.   Constitutional:       General: She is not in acute distress.     Appearance: Normal appearance. She is well-developed and normal weight. She is not ill-appearing, toxic-appearing or diaphoretic.   HENT:      Head: Normocephalic and atraumatic.      Right Ear: Tympanic membrane, ear canal and external ear normal. There is no impacted cerumen.      Left Ear: Tympanic membrane, ear canal and external ear normal. There is no impacted cerumen.      Nose: Nose normal.      Mouth/Throat:      Mouth: Mucous membranes are moist.      Pharynx: Oropharynx is clear. No oropharyngeal exudate or posterior oropharyngeal erythema.   Eyes:      General: No scleral icterus.        Right eye: No discharge.         Left eye: No discharge.      Conjunctiva/sclera: Conjunctivae normal.   Neck:      Thyroid: No thyromegaly.      Vascular: No JVD.   Cardiovascular:      Rate and Rhythm: Normal rate and regular rhythm.      Heart sounds: No murmur heard.     No " gallop.   Pulmonary:      Effort: Pulmonary effort is normal. No respiratory distress.      Breath sounds: Normal breath sounds. No stridor. No wheezing or rhonchi.   Abdominal:      General: There is no distension.      Palpations: Abdomen is soft. There is no mass.      Tenderness: There is no abdominal tenderness. There is no guarding or rebound.   Musculoskeletal:      Cervical back: Neck supple. No rigidity or tenderness.      Right lower leg: No edema.      Left lower leg: No edema.   Lymphadenopathy:      Cervical: No cervical adenopathy.   Skin:     General: Skin is warm and dry.      Coloration: Skin is not jaundiced or pale.   Neurological:      General: No focal deficit present.      Mental Status: She is alert and oriented to person, place, and time. Mental status is at baseline.      Deep Tendon Reflexes: Reflexes normal.   Psychiatric:         Mood and Affect: Mood normal.         Behavior: Behavior normal.         Thought Content: Thought content normal.         Judgment: Judgment normal.         Assessment/Plan   Diagnoses and all orders for this visit:    Encounter for general adult medical examination without abnormal findings (Primary)  Assessment & Plan:  Age appropriate AG provided to pt  Fast labs utd wnl  ASCVD 4.4%  Pt plans improved diet, less creamy, less ETOh etc.  shingrix 2, flu, covid booster utd from [facility]  req RSV today.  Mammo, pap, cscope are utd  resee one year pt req PAP then with CPE.        Recurrent major depressive disorder, in partial remission (CMS/HCC)    Essential hypertension    Need for RSV immunization  -     RSV vaccine adjuvanted IM (Arex[facility]/GSK - PCP Practices Only)

## 2024-01-15 NOTE — ASSESSMENT & PLAN NOTE
Age appropriate AG provided to pt  Fast labs utd wnl  ASCVD 4.4%  Pt plans improved diet, less creamy, less ETOh etc.  shingrix 2, flu, covid booster utd from [facility]  req RSV today.  Mammo, pap, cscope are utd  resee one year pt req PAP then with CPE.

## 2024-02-13 ENCOUNTER — APPOINTMENT (OUTPATIENT)
Dept: URBAN - METROPOLITAN AREA CLINIC 203 | Age: 64
Setting detail: DERMATOLOGY
End: 2024-02-19

## 2024-02-13 DIAGNOSIS — L57.0 ACTINIC KERATOSIS: ICD-10-CM

## 2024-02-13 DIAGNOSIS — L72.0 EPIDERMAL CYST: ICD-10-CM

## 2024-02-13 DIAGNOSIS — L98.8 OTHER SPECIFIED DISORDERS OF THE SKIN AND SUBCUTANEOUS TISSUE: ICD-10-CM

## 2024-02-13 DIAGNOSIS — L91.8 OTHER HYPERTROPHIC DISORDERS OF THE SKIN: ICD-10-CM

## 2024-02-13 DIAGNOSIS — L57.8 OTHER SKIN CHANGES DUE TO CHRONIC EXPOSURE TO NONIONIZING RADIATION: ICD-10-CM

## 2024-02-13 PROCEDURE — OTHER BOTOX (U OR CC) ADDITIVE: OTHER

## 2024-02-13 PROCEDURE — OTHER LIQUID NITROGEN (COSMETIC): OTHER

## 2024-02-13 PROCEDURE — OTHER COUNSELING: OTHER

## 2024-02-13 PROCEDURE — 99213 OFFICE O/P EST LOW 20 MIN: CPT | Mod: 25

## 2024-02-13 PROCEDURE — OTHER BIOPSY BY PUNCH METHOD: OTHER

## 2024-02-13 PROCEDURE — 11104 PUNCH BX SKIN SINGLE LESION: CPT

## 2024-02-13 PROCEDURE — 17000 DESTRUCT PREMALG LESION: CPT | Mod: 59

## 2024-02-13 PROCEDURE — OTHER LIQUID NITROGEN: OTHER

## 2024-02-13 PROCEDURE — OTHER BOTOX (U OR CC): OTHER

## 2024-02-13 PROCEDURE — OTHER REASSURANCE: OTHER

## 2024-02-13 PROCEDURE — OTHER SUNSCREEN RECOMMENDATIONS: OTHER

## 2024-02-13 ASSESSMENT — LOCATION SIMPLE DESCRIPTION DERM
LOCATION SIMPLE: LEFT BREAST
LOCATION SIMPLE: RIGHT FOREHEAD
LOCATION SIMPLE: GLABELLA
LOCATION SIMPLE: ABDOMEN
LOCATION SIMPLE: RIGHT UPPER BACK
LOCATION SIMPLE: LEFT ANTERIOR NECK
LOCATION SIMPLE: LEFT FOREHEAD

## 2024-02-13 ASSESSMENT — LOCATION DETAILED DESCRIPTION DERM
LOCATION DETAILED: GLABELLA
LOCATION DETAILED: RIGHT SUPERIOR FOREHEAD
LOCATION DETAILED: RIGHT RIB CAGE
LOCATION DETAILED: LEFT SUPERIOR MEDIAL FOREHEAD
LOCATION DETAILED: RIGHT FOREHEAD
LOCATION DETAILED: LEFT SUPERIOR FOREHEAD
LOCATION DETAILED: LEFT INFERIOR MEDIAL FOREHEAD
LOCATION DETAILED: LEFT SUPERIOR LATERAL NECK
LOCATION DETAILED: LEFT MEDIAL BREAST 10-11:00 REGION
LOCATION DETAILED: RIGHT MEDIAL FOREHEAD
LOCATION DETAILED: LEFT FOREHEAD
LOCATION DETAILED: LEFT MEDIAL BREAST 11-12:00 REGION
LOCATION DETAILED: RIGHT SUPERIOR UPPER BACK
LOCATION DETAILED: RIGHT INFERIOR MEDIAL FOREHEAD

## 2024-02-13 ASSESSMENT — LOCATION ZONE DERM
LOCATION ZONE: TRUNK
LOCATION ZONE: FACE
LOCATION ZONE: NECK

## 2024-02-13 NOTE — PROCEDURE: LIQUID NITROGEN
Duration Of Freeze Thaw-Cycle (Seconds): 5
Post-Care Instructions: I reviewed with the patient in detail post-care instructions. Patient is to wear sunprotection, and avoid picking at any of the treated lesions. Pt may apply Vaseline to crusted or scabbing areas.
Show Applicator Variable?: Yes
Number Of Freeze-Thaw Cycles: 2 freeze-thaw cycles
Render Post-Care Instructions In Note?: no
Detail Level: Zone
Consent: The patient's consent was obtained including but not limited to risks of crusting, scabbing, blistering, scarring, darker or lighter pigmentary change, recurrence, incomplete removal and infection.
Application Tool (Optional): Liquid Nitrogen Sprayer

## 2024-02-13 NOTE — PROCEDURE: BOTOX (U OR CC)
Show Inventory Tab: Show
Detail Level: Detailed
Measure In Units Or Cc's?: units
Post-Care Instructions: Patient instructed to not lie down for 4 hours and limit physical activity for 24 hours.
Dilution (U/0.1 Cc): 4
Consent: Written consent obtained. Risks include but not limited to lid/brow ptosis, bruising, swelling, diplopia, temporary effect, incomplete chemical denervation.
Quantity Per Injection Site (Units Or Cc): 2
Quantity Per Injection Site (Units Or Cc): 1

## 2024-02-13 NOTE — PROCEDURE: BIOPSY BY PUNCH METHOD

## 2024-02-20 ENCOUNTER — RX ONLY (RX ONLY)
Age: 64
End: 2024-02-20

## 2024-02-20 RX ORDER — BIMATOPROST 0.3 MG/ML
SOLUTION/ DROPS OPHTHALMIC QD
Qty: 3 | Refills: 3 | Status: ERX

## 2024-02-27 ENCOUNTER — APPOINTMENT (OUTPATIENT)
Dept: URBAN - METROPOLITAN AREA CLINIC 203 | Age: 64
Setting detail: DERMATOLOGY
End: 2024-02-28

## 2024-02-27 DIAGNOSIS — L72.0 EPIDERMAL CYST: ICD-10-CM

## 2024-02-27 PROCEDURE — 99024 POSTOP FOLLOW-UP VISIT: CPT

## 2024-02-27 PROCEDURE — OTHER SUTURE REMOVAL (GLOBAL PERIOD): OTHER

## 2024-02-27 ASSESSMENT — LOCATION ZONE DERM: LOCATION ZONE: TRUNK

## 2024-02-27 ASSESSMENT — LOCATION DETAILED DESCRIPTION DERM: LOCATION DETAILED: RIGHT SUPERIOR UPPER BACK

## 2024-02-27 ASSESSMENT — LOCATION SIMPLE DESCRIPTION DERM: LOCATION SIMPLE: RIGHT UPPER BACK

## 2024-02-27 NOTE — PROCEDURE: SUTURE REMOVAL (GLOBAL PERIOD)
Body Location Override (Optional - Billing Will Still Be Based On Selected Body Map Location If Applicable): back
Detail Level: Simple
Add 49209 Cpt? (Important Note: In 2017 The Use Of 54726 Is Being Tracked By Cms To Determine Future Global Period Reimbursement For Global Periods): yes

## 2024-03-12 ENCOUNTER — OFFICE VISIT (OUTPATIENT)
Dept: SURGERY | Facility: CLINIC | Age: 64
End: 2024-03-12
Payer: COMMERCIAL

## 2024-03-12 VITALS
WEIGHT: 149.2 LBS | TEMPERATURE: 98.2 F | HEART RATE: 58 BPM | HEIGHT: 64 IN | DIASTOLIC BLOOD PRESSURE: 82 MMHG | BODY MASS INDEX: 25.47 KG/M2 | SYSTOLIC BLOOD PRESSURE: 122 MMHG | OXYGEN SATURATION: 99 %

## 2024-03-12 DIAGNOSIS — R13.19 OTHER DYSPHAGIA: ICD-10-CM

## 2024-03-12 DIAGNOSIS — K21.9 GASTROESOPHAGEAL REFLUX DISEASE WITHOUT ESOPHAGITIS: Primary | ICD-10-CM

## 2024-03-12 PROCEDURE — 3008F BODY MASS INDEX DOCD: CPT | Performed by: SURGERY

## 2024-03-12 PROCEDURE — 99213 OFFICE O/P EST LOW 20 MIN: CPT | Performed by: SURGERY

## 2024-03-12 ASSESSMENT — ENCOUNTER SYMPTOMS
BACK PAIN: 0
ACTIVITY CHANGE: 0
NAUSEA: 0
PALPITATIONS: 0
CHEST TIGHTNESS: 0
ABDOMINAL PAIN: 0
BLOOD IN STOOL: 0
APPETITE CHANGE: 0
WHEEZING: 0
ABDOMINAL DISTENTION: 0
BRUISES/BLEEDS EASILY: 0
COUGH: 0
SHORTNESS OF BREATH: 0
UNEXPECTED WEIGHT CHANGE: 0
VOMITING: 0

## 2024-03-12 NOTE — PROGRESS NOTES
Surgery Office Note       Patient: Francine Tuttle  MRN: [id number]  1960    Visit Date: 3/12/2024  Encounter Provider: Buddy Pastor  Referring Provider: Ava Auguste DO    Reason for visit:   Chief Complaint   Patient presents with   • Follow-up     Needs Upper Endoscopy      HPI   Francine Tuttle is a 63 y.o. female who presents with a history of gastroesophageal reflux disease which is controlled with her omeprazole.  On 1 occasion Francine has had dysphagia of a piece of fat she swallowed, producing chest pain and back pain.  Since going on the omeprazole Francine has gained 5 pounds.  There is no family history of esophageal or gastric cancer.  Francine denies chest pain on exertion, palpitations, cough, wheezing, shortness of breath, melena or blood per rectum.  There is no history of bleeding disorder.      Past Medical History:   Diagnosis Date   • GERD (gastroesophageal reflux disease)    • Hypertension      Past Surgical History:   Procedure Laterality Date   • BRAIN SURGERY  05/2017   • BREAST LUMPECTOMY     • COLONOSCOPY      2012     Social History     Social History Narrative        Children    Works FT, events - RETIRED 2023    Walks 5 miles per day.     Family History   Problem Relation Age of Onset   • Parkinsonism Biological Father    • Colon cancer Neg Hx      Latex and Sulfasalazine  Current Outpatient Medications   Medication Sig Dispense Refill   • bimatoprost (LATISSE) 0.03 % ophthalmic solution APPLY 1 GTT TO EACH EYELID Q NIGHT  3   • lisinopriL-hydrochlorothiazide (PRINZIDE,ZESTORETIC) 10-12.5 mg per tablet Take 0.5 tablets by mouth daily. 90 tablet 3   • omeprazole (PriLOSEC) 40 mg capsule Take 1 capsule (40 mg total) by mouth daily before breakfast. 90 capsule 0     No current facility-administered medications for this visit.       Review of Systems   Constitutional: Negative for activity change, appetite change and unexpected weight change.    Respiratory: Negative for cough, chest tightness, shortness of breath and wheezing.    Cardiovascular: Negative for chest pain and palpitations.   Gastrointestinal: Negative for abdominal distention, abdominal pain, blood in stool, nausea and vomiting.   Musculoskeletal: Negative for back pain.   Hematological: Does not bruise/bleed easily.     Objective   Vitals:    03/12/24 1045   BP: 122/82   Pulse: (!) 58   Temp: 36.8 °C (98.2 °F)   SpO2: 99%       Physical Exam  Vitals and nursing note reviewed.   Constitutional:       General: She is not in acute distress.     Appearance: She is not ill-appearing, toxic-appearing or diaphoretic.   Eyes:      General: No scleral icterus.     Conjunctiva/sclera: Conjunctivae normal.   Pulmonary:      Effort: Pulmonary effort is normal. No respiratory distress.   Abdominal:      General: There is no distension.   Skin:     General: Skin is warm and dry.      Coloration: Skin is not jaundiced or pale.   Neurological:      Mental Status: She is alert and oriented to person, place, and time.   Psychiatric:         Mood and Affect: Mood normal.         Behavior: Behavior normal.         Thought Content: Thought content normal.         Judgment: Judgment normal.           Labs  Lab Results   Component Value Date    WBC 5.8 06/30/2020    HGB 13.6 06/30/2020    HCT 40.2 06/30/2020    MCV 86 06/30/2020     06/30/2020       Lab Results   Component Value Date    GLUCOSE 83 01/09/2024    CALCIUM 9.4 01/09/2024     01/09/2024    K 4.6 01/09/2024    CO2 31 01/09/2024     01/09/2024    BUN 16 01/09/2024    CREATININE 0.61 01/09/2024       Lab Results   Component Value Date    ALT 19 01/09/2024    AST 18 01/09/2024    ALKPHOS 53 01/09/2024    BILITOT 0.4 01/09/2024        Imaging  Not applicable   Assessment: Gastroesophageal reflux disease, dysphagia    Plan: Upper endoscopy.  Francine expressed understanding of the indications for the procedure, the procedure which is  planned, the alternatives, including doing nothing, and the risks and complications of each.  I did answer all of her questions to her satisfaction.       Buddy Pastor MD  3/12/2024

## 2024-03-16 DIAGNOSIS — I10 ESSENTIAL HYPERTENSION: Chronic | ICD-10-CM

## 2024-03-17 RX ORDER — LISINOPRIL AND HYDROCHLOROTHIAZIDE 10; 12.5 MG/1; MG/1
0.5 TABLET ORAL DAILY
Qty: 90 TABLET | Refills: 3 | Status: SHIPPED | OUTPATIENT
Start: 2024-03-17 | End: 2024-06-06 | Stop reason: SDUPTHER

## 2024-03-21 PROCEDURE — 43239 EGD BIOPSY SINGLE/MULTIPLE: CPT | Performed by: SURGERY

## 2024-03-21 RX ORDER — PANTOPRAZOLE SODIUM 40 MG/1
40 TABLET, DELAYED RELEASE ORAL 2 TIMES DAILY
Qty: 180 TABLET | Refills: 1 | Status: SHIPPED | OUTPATIENT
Start: 2024-03-21 | End: 2024-08-19 | Stop reason: SDUPTHER

## 2024-05-24 ENCOUNTER — HOSPITAL ENCOUNTER (OUTPATIENT)
Facility: CLINIC | Age: 64
Discharge: HOME | End: 2024-05-24
Attending: FAMILY MEDICINE
Payer: COMMERCIAL

## 2024-05-24 ENCOUNTER — APPOINTMENT (OUTPATIENT)
Dept: RADIOLOGY | Age: 64
End: 2024-05-24
Attending: FAMILY MEDICINE
Payer: COMMERCIAL

## 2024-05-24 VITALS
BODY MASS INDEX: 26.58 KG/M2 | WEIGHT: 150 LBS | HEART RATE: 76 BPM | SYSTOLIC BLOOD PRESSURE: 154 MMHG | OXYGEN SATURATION: 98 % | DIASTOLIC BLOOD PRESSURE: 83 MMHG | TEMPERATURE: 97.7 F | HEIGHT: 63 IN

## 2024-05-24 DIAGNOSIS — S20.219A CONTUSION OF RIB, UNSPECIFIED LATERALITY, INITIAL ENCOUNTER: ICD-10-CM

## 2024-05-24 DIAGNOSIS — S16.1XXA ACUTE STRAIN OF NECK MUSCLE, INITIAL ENCOUNTER: Primary | ICD-10-CM
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
## 2024-05-24 DIAGNOSIS — T07.XXXA ABRASIONS OF MULTIPLE SITES: ICD-10-CM

## 2024-05-24 PROCEDURE — 99214 OFFICE O/P EST MOD 30 MIN: CPT | Performed by: FAMILY MEDICINE

## 2024-05-24 PROCEDURE — S9083 URGENT CARE CENTER GLOBAL: HCPCS | Performed by: FAMILY MEDICINE

## 2024-05-24 PROCEDURE — 72040 X-RAY EXAM NECK SPINE 2-3 VW: CPT | Performed by: FAMILY MEDICINE

## 2024-05-24 PROCEDURE — 71111 X-RAY EXAM RIBS/CHEST4/> VWS: CPT | Performed by: FAMILY MEDICINE

## 2024-05-24 ASSESSMENT — ENCOUNTER SYMPTOMS
FEVER: 0
CHEST TIGHTNESS: 0
WEAKNESS: 0
NECK PAIN: 1
PALPITATIONS: 0
FATIGUE: 0
WHEEZING: 0
CHILLS: 0
HEADACHES: 0
NUMBNESS: 0
SHORTNESS OF BREATH: 0
NECK STIFFNESS: 1
DIZZINESS: 0
MYALGIAS: 1

## 2024-05-24 NOTE — DISCHARGE INSTRUCTIONS
Recommending proper wound care:   Hand wash, pat dry     For open wounds:  Apply triple antibiotic such as neosporin, or, more highly recommended is Manuka Honey (Look for a K-Factor or UMF of 10 or greater, this ensures it is medical grade)  Apply bandage (Recommending Telfa Non-adherent bandages).      Repeat every 24hrs, sooner as needed.   __________________________________________________________    Monitor for signs and symptoms of infection (worsening pain, redness, tenderness, swelling).  Please return for care if these symptoms arise.   __________________________________________________________    Ice the injured area to help reduce pain and swelling. Wrap a cold source (recommending a bag of frozen peas or frozen corn) in a thin towel. Apply to the injured area for 20 minutes every 1 to 2 hours the first day. Continue this 3 to 4 times a day until the pain and swelling goes away.     Remember,  Ice is Nice, that's why they rhyme, and you can use it ALL the time : )     __________________________________________________________     **Avoid applying heat** __________________________________________________________    For pain:    You may take Tylenol 500mg doses every 4hrs OR 650mg doses every 6hrs (Max daily dose not to exceed 3000mg!)  __________________________________________________________     If symptoms persist or worsen, consider following up with Primary Care Provider and/or an Orthopedic specialist.    __________________________________________________________    Please let us know about your experience today!   Your input is truly appreciated and helps Dr. Hu and your team at [facility] to continue to provide a superior level of service!   Get Well Soon!
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
## 2024-06-04 ENCOUNTER — TELEPHONE (OUTPATIENT)
Dept: PRIMARY CARE | Facility: CLINIC | Age: 64
End: 2024-06-04
Payer: COMMERCIAL
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
## 2024-06-05 ENCOUNTER — OFFICE VISIT (OUTPATIENT)
Dept: PRIMARY CARE | Facility: CLINIC | Age: 64
End: 2024-06-05
Payer: COMMERCIAL

## 2024-06-05 ENCOUNTER — TELEPHONE (OUTPATIENT)
Dept: PRIMARY CARE | Facility: CLINIC | Age: 64
End: 2024-06-05

## 2024-06-05 VITALS
HEART RATE: 71 BPM | BODY MASS INDEX: 26.71 KG/M2 | OXYGEN SATURATION: 98 % | SYSTOLIC BLOOD PRESSURE: 124 MMHG | WEIGHT: 150.8 LBS | TEMPERATURE: 97.9 F | DIASTOLIC BLOOD PRESSURE: 84 MMHG

## 2024-06-05 DIAGNOSIS — Z86.000 HISTORY OF DUCTAL CARCINOMA IN SITU (DCIS) OF BREAST: ICD-10-CM

## 2024-06-05 DIAGNOSIS — F33.41 RECURRENT MAJOR DEPRESSIVE DISORDER, IN PARTIAL REMISSION (CMS/HCC): Chronic | ICD-10-CM

## 2024-06-05 DIAGNOSIS — T85.43XA BREAST IMPLANT LEAK, INITIAL ENCOUNTER: Primary | ICD-10-CM

## 2024-06-05 PROCEDURE — 3008F BODY MASS INDEX DOCD: CPT | Performed by: FAMILY MEDICINE

## 2024-06-05 PROCEDURE — 99214 OFFICE O/P EST MOD 30 MIN: CPT | Performed by: FAMILY MEDICINE

## 2024-06-05 ASSESSMENT — ENCOUNTER SYMPTOMS
PAIN: 1
BRUISES/BLEEDS EASILY: 1
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
## 2024-06-05 NOTE — TELEPHONE ENCOUNTER
Per CM --    Advise fernando she must have b/l mri breast and must be with and without contrast so needs bmp now.       LM for pt to call the office.

## 2024-06-06 DIAGNOSIS — I10 ESSENTIAL HYPERTENSION: Chronic | ICD-10-CM

## 2024-06-06 RX ORDER — LISINOPRIL AND HYDROCHLOROTHIAZIDE 10; 12.5 MG/1; MG/1
0.5 TABLET ORAL DAILY
Qty: 90 TABLET | Refills: 3 | Status: SHIPPED | OUTPATIENT
Start: 2024-06-06

## 2024-06-07 ENCOUNTER — APPOINTMENT (OUTPATIENT)
Dept: URBAN - METROPOLITAN AREA CLINIC 203 | Age: 64
Setting detail: DERMATOLOGY
End: 2024-06-07

## 2024-06-07 DIAGNOSIS — L98.8 OTHER SPECIFIED DISORDERS OF THE SKIN AND SUBCUTANEOUS TISSUE: ICD-10-CM

## 2024-06-07 PROCEDURE — OTHER JEUVEAU (U OR CC): OTHER

## 2024-06-07 ASSESSMENT — LOCATION SIMPLE DESCRIPTION DERM
LOCATION SIMPLE: GLABELLA
LOCATION SIMPLE: RIGHT FOREHEAD
LOCATION SIMPLE: LEFT FOREHEAD
LOCATION SIMPLE: RIGHT TEMPLE
LOCATION SIMPLE: LEFT TEMPLE

## 2024-06-07 ASSESSMENT — LOCATION DETAILED DESCRIPTION DERM
LOCATION DETAILED: RIGHT SUPERIOR FOREHEAD
LOCATION DETAILED: LEFT SUPERIOR LATERAL FOREHEAD
LOCATION DETAILED: LEFT MID TEMPLE
LOCATION DETAILED: RIGHT INFERIOR TEMPLE
LOCATION DETAILED: GLABELLA
LOCATION DETAILED: RIGHT INFERIOR MEDIAL FOREHEAD
LOCATION DETAILED: LEFT SUPERIOR FOREHEAD
LOCATION DETAILED: RIGHT FOREHEAD
LOCATION DETAILED: LEFT INFERIOR TEMPLE
LOCATION DETAILED: LEFT INFERIOR MEDIAL FOREHEAD

## 2024-06-07 ASSESSMENT — LOCATION ZONE DERM: LOCATION ZONE: FACE

## 2024-06-07 NOTE — PROCEDURE: JEUVEAU (U OR CC)
Consent: Written consent obtained. Risks include but not limited to lid/brow ptosis, bruising, swelling, diplopia, temporary effect, incomplete chemical denervation.
Measure In Units Or Cc's?: units
Dilution (U/0.1 Cc): 4
Detail Level: Detailed
Post-Care Instructions: Patient instructed to not lie down for 4 hours and limit physical activity for 24 hours.
Show Inventory Tab: Show
Quantity Per Injection Site (Units): 2
Quantity Per Injection Site (Units): 1

## 2024-06-11 ENCOUNTER — HOSPITAL ENCOUNTER (OUTPATIENT)
Dept: RADIOLOGY | Facility: HOSPITAL | Age: 64
Discharge: HOME | End: 2024-06-11
Attending: FAMILY MEDICINE
Payer: COMMERCIAL

## 2024-06-11 VITALS — BODY MASS INDEX: 26.57 KG/M2 | WEIGHT: 150 LBS

## 2024-06-11 DIAGNOSIS — Z86.000 HISTORY OF DUCTAL CARCINOMA IN SITU (DCIS) OF BREAST: ICD-10-CM

## 2024-06-11 DIAGNOSIS — T85.43XA BREAST IMPLANT LEAK, INITIAL ENCOUNTER: ICD-10-CM

## 2024-06-11 LAB
BUN SERPL-MCNC: 24 MG/DL (ref 7–25)
BUN/CREAT SERPL: NORMAL (CALC) (ref 6–22)
CALCIUM SERPL-MCNC: 9.4 MG/DL (ref 8.6–10.4)
CHLORIDE SERPL-SCNC: 104 MMOL/L (ref 98–110)
CO2 SERPL-SCNC: 28 MMOL/L (ref 20–32)
CREAT SERPL-MCNC: 0.68 MG/DL (ref 0.5–1.05)
EGFRCR SERPLBLD CKD-EPI 2021: 99 ML/MIN/1.73M2
GLUCOSE SERPL-MCNC: 91 MG/DL (ref 65–99)
POTASSIUM SERPL-SCNC: 4.6 MMOL/L (ref 3.5–5.3)
SODIUM SERPL-SCNC: 140 MMOL/L (ref 135–146)

## 2024-06-11 PROCEDURE — A9579 GAD-BASE MR CONTRAST NOS,1ML: HCPCS | Performed by: FAMILY MEDICINE

## 2024-06-11 PROCEDURE — C8908 MRI W/O FOL W/CONT, BREAST,: HCPCS

## 2024-06-11 PROCEDURE — A9573: HCPCS | Performed by: FAMILY MEDICINE

## 2024-06-11 RX ADMIN — GADOPICLENOL 6.8 ML: 485.1 INJECTION INTRAVENOUS at 13:01

## 2024-06-11 NOTE — PROGRESS NOTES
"Francine Tuttle   [id number]    Your doctor has referred you for an MRI examination that requires the injection of a contrast material into your bloodstream. This contrast material, sometimes referred to as \"x-ray dye\" allows for better interpretation and results in a more accurate interpretation of the examination.     Without the use of contrast, the examination may be less informative and result in a suboptimal examination, and possibly a delay in diagnosis and, if needed, treatment.     The contrast material is given through a small needle or catheter placed into a vein, usually on the inside of the elbow, on the back of hand, or in a vein in the foot or lower leg.    The most common, though still rare, potential reaction to an intravenous contrast injection is an allergic-like reaction. Most allergic-like reactions are mild, though a small subset of people can have more severe reactions. Mild reactions include mild / scattered hives, itching, scratchy throat, sneezing and nasal congestion. More severe reactions include facial swelling, severe difficulty breathing, wheezing and anaphylactic shock. Those with a prior history allergic-like reaction to the same class of contrast media (such as CT contrast or MRI contrast) are at the highest risk for an allergic reaction.     If you believe you had an allergic reaction to contrast in the past, please let our staff know. We can determine if this increases your risk for a future reaction and provide steps to decrease the risk. This may delay your examination, but it decreases the risk of having a new and possibly more severe reaction to the contrast injection.    People with a history of prior allergic reactions to other substances (such as unrelated medications and food) and patients with a history of asthma have slightly increased risk for an allergic reaction from contrast material when compared with the general population, but do not require to be " pretreated prior to a contrast injection.    You should notify the physician, nurse or technologist if you have ever had any of these conditions or if you have any questions.

## 2024-06-13 ENCOUNTER — TELEPHONE (OUTPATIENT)
Dept: PRIMARY CARE | Facility: CLINIC | Age: 64
End: 2024-06-13
Payer: COMMERCIAL

## 2024-06-13 DIAGNOSIS — S22.31XA CLOSED TRAUMATIC DISPLACED FRACTURE OF RIB ON RIGHT SIDE, INITIAL ENCOUNTER: Primary | ICD-10-CM

## 2024-06-13 NOTE — TELEPHONE ENCOUNTER
----- Message from JUDY Interiano sent at 6/13/2024 12:36 PM EDT -----  If she doesn't answer portal by the end of the day can you call and review her results of MRI with her.     ----- Message -----  From: Interface, Radiology Results In  Sent: 6/12/2024   2:32 PM EDT  To: Ava Auguste, DO
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
## 2024-06-17 ENCOUNTER — TELEPHONE (OUTPATIENT)
Dept: ORTHOPEDICS | Facility: CLINIC | Age: 64
End: 2024-06-17
Payer: COMMERCIAL
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
## 2024-07-01 ENCOUNTER — OFFICE VISIT (OUTPATIENT)
Dept: OTOLARYNGOLOGY | Facility: CLINIC | Age: 64
End: 2024-07-01
Payer: COMMERCIAL

## 2024-07-01 ENCOUNTER — PROCEDURE VISIT (OUTPATIENT)
Dept: OTOLARYNGOLOGY | Facility: CLINIC | Age: 64
End: 2024-07-01
Payer: COMMERCIAL

## 2024-07-01 VITALS
BODY MASS INDEX: 25.69 KG/M2 | WEIGHT: 145 LBS | SYSTOLIC BLOOD PRESSURE: 120 MMHG | HEIGHT: 63 IN | DIASTOLIC BLOOD PRESSURE: 82 MMHG | TEMPERATURE: 97.7 F

## 2024-07-01 DIAGNOSIS — H60.542 ACUTE ECZEMATOID OTITIS EXTERNA OF LEFT EAR: Primary | ICD-10-CM

## 2024-07-01 DIAGNOSIS — H61.23 BILATERAL IMPACTED CERUMEN: ICD-10-CM

## 2024-07-01 DIAGNOSIS — H90.42 SENSORINEURAL HEARING LOSS (SNHL) OF LEFT EAR WITH UNRESTRICTED HEARING OF RIGHT EAR: Primary | ICD-10-CM

## 2024-07-01 PROCEDURE — 99999 PR OFFICE/OUTPT VISIT,PROCEDURE ONLY: CPT | Performed by: AUDIOLOGIST-HEARING AID FITTER

## 2024-07-01 PROCEDURE — 69210 REMOVE IMPACTED EAR WAX UNI: CPT | Performed by: OTOLARYNGOLOGY

## 2024-07-01 PROCEDURE — 99203 OFFICE O/P NEW LOW 30 MIN: CPT | Mod: 25 | Performed by: OTOLARYNGOLOGY

## 2024-07-01 PROCEDURE — 92557 COMPREHENSIVE HEARING TEST: CPT | Performed by: AUDIOLOGIST-HEARING AID FITTER

## 2024-07-01 PROCEDURE — 92567 TYMPANOMETRY: CPT | Performed by: AUDIOLOGIST-HEARING AID FITTER

## 2024-07-01 PROCEDURE — 3008F BODY MASS INDEX DOCD: CPT | Performed by: OTOLARYNGOLOGY

## 2024-07-01 RX ORDER — MOMETASONE FUROATE 1 MG/G
CREAM TOPICAL DAILY
Qty: 45 G | Refills: 1 | Status: SHIPPED | OUTPATIENT
Start: 2024-07-01 | End: 2024-08-16 | Stop reason: SDUPTHER

## 2024-07-01 NOTE — PROGRESS NOTES
"    [facility]  [address], [address]  [place] [address]  Phone: [phone]  Fax: [phone]      Patient ID: Francine Tuttle                              : 1960    Visit Date: 2024  Referring Provider: Ava Auguste DO    Chief Complaint: Cerumen Impaction and Hearing Loss      Francine Tuttle is a 63 y.o.  female who presents with hearing concerns.  She feels like her right ear is not hearing as well as the left thinks it could be wax.  She does have a history of a trigeminal nerve surgery on the left side she was told that this could affect her hearing.  She did take an online hearing test that showed 1 side was worse in the past.  She is no history of recurrent ear infections or ear surgeries.  Her ears do get itchy and dry.  Occasionally she uses cortisone ointment    Review of Systems   All other systems reviewed and are negative.      Current Medications: has a current medication list which includes the following prescription(s): bimatoprost, lisinopril-hydrochlorothiazide, and pantoprazole.    Past Medical History:   Past Medical History:   Diagnosis Date    GERD (gastroesophageal reflux disease)     Hypertension        Past Surgical History:   Past Surgical History:   Procedure Laterality Date    BRAIN SURGERY  2017    BREAST LUMPECTOMY      COLONOSCOPY             Social History:   Social History     Tobacco Use    Smoking status: Never    Smokeless tobacco: Never   Vaping Use    Vaping Use: Never used   Substance Use Topics    Alcohol use: Yes    Drug use: No       Family History:   Family History   Problem Relation Age of Onset    Parkinsonism Biological Father     Colon cancer Neg Hx            Allergies: Latex and Sulfasalazine    Physical Exam:  Vitals: Visit Vitals  /82 (BP Location: Left upper arm, Patient Position: Sitting)   Temp 36.5 °C (97.7 °F) (Temporal)   Ht 1.6 m (5' 3\")   Wt 65.8 kg (145 lb)   BMI 25.69 kg/m²     General:  " Well-developed, well-nourished 63 y.o. femalein no acute distress.  Voice: Normal without hoarseness, breathiness or stridor.  Head/face:  No scars or lesions.  No parotid masses. No presinus tenderness. Symmetric, normal facies.  Eyes:  Extraocular movements and gaze alignment normal.  Ears: Auricles normal.  Eczema noted in ear canal skin and debbie bowl on the left external auditory canals obstructed with cerumen bilaterally.  Tympanic membranes clear, mobile and without retraction or scar.  Bilateral ears visualized with binocular otomicroscope.  This was medically necessary to obtain an accurate exam of the tympanic membrane   Nose:  Dorsum straight.  Septum midline turbinates normal in size and orientation.  No pus, polyps or crusts.  Oral Cavity/oropharynx:  Normal tongue thrust. Normal gag reflex. No masses, leukoplakia, erythroplakia, ulcers or other abnormalities at the tongue, floor of mouth, buccal mucosa, palate or posterior pharyngeal wall.     Neck:  No masses, adenopathy, cervical spasm or thyroid enlargement.  Cranial Nerves II through XII: Grossly intact.  Lungs: equal chest rise  Heart: Regular rate and rhythm.  Mental status: Awake, alert and oriented ×3.    Physical Exam   Audiogram: Normal audiogram bilaterally    Tympanogram: Mobile bilaterally    Cerumen Removal-   Both ears were examined under the otomicroscope and noted to have cerumen impaction obstruting adequate visualization of the tympanic membrane.  Using a combination of suction, curettes, alligator and micro instruments, the cerumen was successfully extracted.  At the conclusion of the procedure the EAC was noted to be healthy and intact without evidence of bleeding or trama. The tympanic membrane appeared intact, with normal landmarks and no evidence of effusion    Impression:  63 y.o.  female with Acute eczematoid otitis externa of left ear  (primary encounter diagnosis)    Bilateral impacted cerumen    Wax was removed from both  ears.  There is some eczema in the external ear canal skin on the left I recommend Elocon cream for itchiness and for the eczema.    To soften the wax he can try Debrox eardrops or olive oil.    After the wax was removed the hearing test looks normal both eardrums are moving well.    Problem List Items Addressed This Visit    None  Visit Diagnoses       Acute eczematoid otitis externa of left ear    -  Primary    Bilateral impacted cerumen                Adri Abel MD

## 2024-07-01 NOTE — PATIENT INSTRUCTIONS
Wax was removed from both ears.  There is some eczema in the external ear canal skin on the left I recommend Elocon cream for itchiness and for the eczema.    To soften the wax he can try Debrox eardrops or olive oil.    After the wax was removed the hearing test looks normal both eardrums are moving well.

## 2024-07-09 ENCOUNTER — OFFICE VISIT (OUTPATIENT)
Dept: CARDIOLOGY | Facility: CLINIC | Age: 64
End: 2024-07-09
Payer: COMMERCIAL

## 2024-07-09 VITALS
HEIGHT: 63 IN | WEIGHT: 145 LBS | DIASTOLIC BLOOD PRESSURE: 74 MMHG | SYSTOLIC BLOOD PRESSURE: 122 MMHG | BODY MASS INDEX: 25.69 KG/M2

## 2024-07-09 DIAGNOSIS — R94.31 ABNORMAL EKG: ICD-10-CM

## 2024-07-09 DIAGNOSIS — E78.2 MIXED HYPERLIPIDEMIA: ICD-10-CM

## 2024-07-09 DIAGNOSIS — M25.511 CHRONIC RIGHT SHOULDER PAIN: Primary | ICD-10-CM

## 2024-07-09 DIAGNOSIS — I10 ESSENTIAL HYPERTENSION: Chronic | ICD-10-CM

## 2024-07-09 DIAGNOSIS — G89.29 CHRONIC RIGHT SHOULDER PAIN: Primary | ICD-10-CM

## 2024-07-09 LAB
ATRIAL RATE: 61
P AXIS: 52
PR INTERVAL: 130
QRS DURATION: 118
QT INTERVAL: 444
QTC CALCULATION(BAZETT): 446
R AXIS: 154
T WAVE AXIS: 5
VENTRICULAR RATE: 61

## 2024-07-09 PROCEDURE — 3008F BODY MASS INDEX DOCD: CPT | Performed by: INTERNAL MEDICINE

## 2024-07-09 PROCEDURE — 99204 OFFICE O/P NEW MOD 45 MIN: CPT | Performed by: INTERNAL MEDICINE

## 2024-07-09 PROCEDURE — 93000 ELECTROCARDIOGRAM COMPLETE: CPT | Performed by: INTERNAL MEDICINE

## 2024-07-09 ASSESSMENT — ENCOUNTER SYMPTOMS
DIZZINESS: 0
LIGHT-HEADEDNESS: 0
DYSPNEA ON EXERTION: 0
PALPITATIONS: 0

## 2024-07-09 NOTE — ASSESSMENT & PLAN NOTE
EKG shows sinus, right axis deviation, atypical LBBB.   Patient has good exercise capacity and denies symptoms.  -Will check Echo and CT coronaries.

## 2024-07-09 NOTE — LETTER
July 9, 2024     Ava Auguste DO  [address] [facility] [employer]  [place] [address]    Patient: Francine Tuttle  YOB: 1960  Date of Visit: 7/9/2024      Dear Dr. Auguste:    Thank you for referring Francine Tuttle to me for evaluation. Below are my notes for this consultation.    If you have questions, please do not hesitate to call me. I look forward to following your patient along with you.         Sincerely,        Beena Smiley MD        CC: No Recipients    Beena Smiley MD  7/9/2024  3:24 PM  Sign when Signing Visit       Cardiology  Office Consult Note         Reason for visit: No chief complaint on file.      HPI    Francine Tuttle is a 63 y.o. female who presents to the office for cardiovascular evaluation.    She has a PMH of HTN and GERD.  She also has a family history of CAD - dad was a doctor, carried around nitro.     FLP 1/9/24: , TRG 95, HDL 91,  (no medication)    Normal BMP on 6/10/24.    APOB was 102 on 4/17/24.     She is unsure if her dad had a heart attack but she knows he had high cholesterol and used to take nitroglycerin.     She walks 5 miles daily and bikes 40 miles at a time without CV complaints. She denies any chest pain, shortness of breath, leg swelling, palpitations, dizziness, or lightheadedness.     Her BP has been well controlled on the 1/2 tab of lisinopril-hctz 10-12.5. She has not been checking at home but it is always good at appointments.     Outside records reviewed. Pertinent lab results reviewed..    Past Medical History:   Diagnosis Date   • Breast cancer (CMS/HCC)     right   • GERD (gastroesophageal reflux disease)     ulcers   • Hypertension    • Lipid disorder        Past Surgical History:   Procedure Laterality Date   • BRAIN SURGERY  05/2017   • BREAST LUMPECTOMY     • COLONOSCOPY      2012        Social History     Tobacco Use   Smoking Status Former   • Types: Cigarettes   Smokeless Tobacco Never    Tobacco Comments    Smoked when she was a teen      Social History     Tobacco Use   • Smoking status: Former     Types: Cigarettes   • Smokeless tobacco: Never   • Tobacco comments:     Smoked when she was a teen    Vaping Use   • Vaping Use: Never used   Substance Use Topics   • Alcohol use: Yes     Comment: a couple of drinks daily   • Drug use: No       Family History   Problem Relation Age of Onset   • Hyperlipidemia Biological Father    • Parkinsonism Biological Father    • Hyperlipidemia Biological Sister    • Heart attack Maternal Grandmother    • Heart disease Maternal Grandmother    • Colon cancer Neg Hx        Allergies:  Latex and Sulfasalazine    Current Outpatient Medications   Medication Sig Dispense Refill   • bimatoprost (LATISSE) 0.03 % ophthalmic solution APPLY 1 GTT TO EACH EYELID Q NIGHT  3   • lisinopriL-hydrochlorothiazide (PRINZIDE,ZESTORETIC) 10-12.5 mg per tablet Take 0.5 tablets by mouth daily. 90 tablet 3   • mometasone (ELOCON) 0.1 % cream Apply topically daily. 45 g 1   • pantoprazole (PROTONIX) 40 mg EC tablet Take 1 tablet (40 mg total) by mouth 2 (two) times a day. 180 tablet 1     No current facility-administered medications for this visit.       Review of Systems   Cardiovascular:  Negative for chest pain, dyspnea on exertion, leg swelling and palpitations.   Neurological:  Negative for dizziness and light-headedness.       Objective    Vitals:    07/09/24 1354   BP: 122/74       Wt Readings from Last 1 Encounters:   07/09/24 65.8 kg (145 lb)       Body mass index is 25.69 kg/m².    Physical Exam  Constitutional:       Appearance: Normal appearance.   Neck:      Vascular: No carotid bruit.   Cardiovascular:      Rate and Rhythm: Normal rate and regular rhythm.      Pulses: Normal pulses.      Heart sounds: Normal heart sounds. No murmur heard.  Pulmonary:      Effort: Pulmonary effort is normal. No respiratory distress.      Breath sounds: Normal breath sounds. No stridor. No  wheezing, rhonchi or rales.   Musculoskeletal:         General: No swelling.   Skin:     General: Skin is warm and dry.   Neurological:      Mental Status: She is alert and oriented to person, place, and time.       ECG   Normal sinus rhythm  Right axis deviation  Left bundle branch block    Abnormal ECG  No previous ECGs available        Hematology  Lab Results   Component Value Date    WBC 5.8 06/30/2020    HGB 13.6 06/30/2020    HCT 40.2 06/30/2020     06/30/2020       Chemistries  Lab Results   Component Value Date     06/10/2024    K 4.6 06/10/2024     06/10/2024    CREATININE 0.68 06/10/2024    BUN 24 06/10/2024    CO2 28 06/10/2024    GLUCOSE 91 06/10/2024    CALCIUM 9.4 06/10/2024    ALT 19 01/09/2024    AST 18 01/09/2024       Cholesterol  Lab Results   Component Value Date    CHOL 268 (H) 01/09/2024    TRIG 95 01/09/2024    HDL 91 01/09/2024    LDLCALC 157 (H) 01/09/2024       Endocrine  Lab Results   Component Value Date    TSH 2.01 09/18/2019       Assessment/Plan    Abnormal EKG  EKG shows sinus, right axis deviation, atypical LBBB.   Patient has good exercise capacity and denies symptoms.  -Will check Echo and CT coronaries.     Essential hypertension  -Blood pressure well controlled with Lisinopril-HCT 10-12.5 mg, 1/2 tab daily.     Mixed hyperlipidemia  FLP 1/9/24: , TRG 95, HDL 91,  (no medication).  No known FH of premature ASCVD- father passed 69 from stroke vs heart attack.   ApoB 104 mg/dl  The 10-year ASCVD risk score (Christian MCNAMARA, et al., 2019) is: 4%  -Moderately elevated LDL.  Apo B and ASCVD risk score are not significantly elevated.  -Will check lipoprotein a and CAC score (with CT coronaries).  Consider start statin if indicated.    No orders of the defined types were placed in this encounter.      There are no discontinued medications.    Orders Placed This Encounter   Procedures   • CTA CORONARY ARTERY WITH IV CONTRAST     Standing Status:   Future      Standing Expiration Date:   7/9/2025     Order Specific Question:   Release to patient     Answer:   Immediate     Order Specific Question:   Release to patient     Answer:   Immediate [1]   • Lipoprotein (a)     Standing Status:   Future     Number of Occurrences:   1     Standing Expiration Date:   7/9/2025     Order Specific Question:   Release to patient     Answer:   Immediate [1]   • Basic metabolic panel     Standing Status:   Future     Number of Occurrences:   1     Standing Expiration Date:   7/9/2025     Order Specific Question:   Release to patient     Answer:   Immediate [1]   • [facility] MUSE ECG 12 lead (clinic performed)     Scheduling Instructions:      PLEASE USE THIS ORDER FOR ECG'S PERFORMED IN PHYSICIAN OFFICES     Order Specific Question:   Release to patient     Answer:   Immediate [1]   • Transthoracic echo (TTE) complete     Standing Status:   Future     Standing Expiration Date:   7/9/2026     Scheduling Instructions:      To schedule cardiology appointments with [facility], call Central Scheduling at [phone]. Thank you.     Order Specific Question:   Is contrast and/or agitated saline (bubbles) indicated for the study? (Contrast = Definity OR Lumason)     Answer:   No     Order Specific Question:   Release to patient     Answer:   Immediate     Order Specific Question:   Release to patient     Answer:   Immediate [1]       Follow Up Plans:  Return in about 3 months (around 10/9/2024).          I, Patrica Virgen, am scribing for, and in the presence of, Beena Smiley.    I, Beena Smiley, personally performed the services described in this documentation as scribed by Patrica Virgen in my presence, and it is both accurate and complete.     Beena Smiley MD  7/9/2024

## 2024-07-09 NOTE — ASSESSMENT & PLAN NOTE
FLP 1/9/24: , TRG 95, HDL 91,  (no medication).  No known FH of premature ASCVD- father passed 69 from stroke vs heart attack.   ApoB 104 mg/dl  The 10-year ASCVD risk score (Christian MCNAMARA, et al., 2019) is: 4%  -Moderately elevated LDL.  Apo B and ASCVD risk score are not significantly elevated.  -Will check lipoprotein a and CAC score (with CT coronaries).  Consider start statin if indicated.

## 2024-07-09 NOTE — PROGRESS NOTES
Cardiology  Office Consult Note         Reason for visit: No chief complaint on file.      HPI     Francine Tuttle is a 63 y.o. female who presents to the office for cardiovascular evaluation.    She has a PMH of HTN and GERD.  She also has a family history of CAD - dad was a doctor, carried around nitro.     FLP 1/9/24: , TRG 95, HDL 91,  (no medication)    Normal BMP on 6/10/24.    APOB was 102 on 4/17/24.     She is unsure if her dad had a heart attack but she knows he had high cholesterol and used to take nitroglycerin.     She walks 5 miles daily and bikes 40 miles at a time without CV complaints. She denies any chest pain, shortness of breath, leg swelling, palpitations, dizziness, or lightheadedness.     Her BP has been well controlled on the 1/2 tab of lisinopril-hctz 10-12.5. She has not been checking at home but it is always good at appointments.     Outside records reviewed. Pertinent lab results reviewed..    Past Medical History:   Diagnosis Date    Breast cancer (CMS/HCC)     right    GERD (gastroesophageal reflux disease)     ulcers    Hypertension     Lipid disorder        Past Surgical History:   Procedure Laterality Date    BRAIN SURGERY  05/2017    BREAST LUMPECTOMY      COLONOSCOPY      2012        Social History     Tobacco Use   Smoking Status Former    Types: Cigarettes   Smokeless Tobacco Never   Tobacco Comments    Smoked when she was a teen      Social History     Tobacco Use    Smoking status: Former     Types: Cigarettes    Smokeless tobacco: Never    Tobacco comments:     Smoked when she was a teen    Vaping Use    Vaping Use: Never used   Substance Use Topics    Alcohol use: Yes     Comment: a couple of drinks daily    Drug use: No       Family History   Problem Relation Age of Onset    Hyperlipidemia Biological Father     Parkinsonism Biological Father     Hyperlipidemia Biological Sister     Heart attack Maternal Grandmother     Heart disease Maternal  Grandmother     Colon cancer Neg Hx        Allergies:  Latex and Sulfasalazine    Current Outpatient Medications   Medication Sig Dispense Refill    bimatoprost (LATISSE) 0.03 % ophthalmic solution APPLY 1 GTT TO EACH EYELID Q NIGHT  3    lisinopriL-hydrochlorothiazide (PRINZIDE,ZESTORETIC) 10-12.5 mg per tablet Take 0.5 tablets by mouth daily. 90 tablet 3    mometasone (ELOCON) 0.1 % cream Apply topically daily. 45 g 1    pantoprazole (PROTONIX) 40 mg EC tablet Take 1 tablet (40 mg total) by mouth 2 (two) times a day. 180 tablet 1     No current facility-administered medications for this visit.       Review of Systems   Cardiovascular:  Negative for chest pain, dyspnea on exertion, leg swelling and palpitations.   Neurological:  Negative for dizziness and light-headedness.       Objective     Vitals:    07/09/24 1354   BP: 122/74       Wt Readings from Last 1 Encounters:   07/09/24 65.8 kg (145 lb)       Body mass index is 25.69 kg/m².    Physical Exam  Constitutional:       Appearance: Normal appearance.   Neck:      Vascular: No carotid bruit.   Cardiovascular:      Rate and Rhythm: Normal rate and regular rhythm.      Pulses: Normal pulses.      Heart sounds: Normal heart sounds. No murmur heard.  Pulmonary:      Effort: Pulmonary effort is normal. No respiratory distress.      Breath sounds: Normal breath sounds. No stridor. No wheezing, rhonchi or rales.   Musculoskeletal:         General: No swelling.   Skin:     General: Skin is warm and dry.   Neurological:      Mental Status: She is alert and oriented to person, place, and time.       ECG   Normal sinus rhythm  Right axis deviation  Left bundle branch block    Abnormal ECG  No previous ECGs available        Hematology  Lab Results   Component Value Date    WBC 5.8 06/30/2020    HGB 13.6 06/30/2020    HCT 40.2 06/30/2020     06/30/2020       Chemistries  Lab Results   Component Value Date     06/10/2024    K 4.6 06/10/2024     06/10/2024     CREATININE 0.68 06/10/2024    BUN 24 06/10/2024    CO2 28 06/10/2024    GLUCOSE 91 06/10/2024    CALCIUM 9.4 06/10/2024    ALT 19 01/09/2024    AST 18 01/09/2024       Cholesterol  Lab Results   Component Value Date    CHOL 268 (H) 01/09/2024    TRIG 95 01/09/2024    HDL 91 01/09/2024    LDLCALC 157 (H) 01/09/2024       Endocrine  Lab Results   Component Value Date    TSH 2.01 09/18/2019       Assessment/Plan     Abnormal EKG  EKG shows sinus, right axis deviation, atypical LBBB.   Patient has good exercise capacity and denies symptoms.  -Will check Echo and CT coronaries.     Essential hypertension  -Blood pressure well controlled with Lisinopril-HCT 10-12.5 mg, 1/2 tab daily.     Mixed hyperlipidemia  FLP 1/9/24: , TRG 95, HDL 91,  (no medication).  No known FH of premature ASCVD- father passed 69 from stroke vs heart attack.   ApoB 104 mg/dl  The 10-year ASCVD risk score (Christian DK, et al., 2019) is: 4%  -Moderately elevated LDL.  Apo B and ASCVD risk score are not significantly elevated.  -Will check lipoprotein a and CAC score (with CT coronaries).  Consider start statin if indicated.    No orders of the defined types were placed in this encounter.      There are no discontinued medications.    Orders Placed This Encounter   Procedures    CTA CORONARY ARTERY WITH IV CONTRAST     Standing Status:   Future     Standing Expiration Date:   7/9/2025     Order Specific Question:   Release to patient     Answer:   Immediate     Order Specific Question:   Release to patient     Answer:   Immediate [1]    Lipoprotein (a)     Standing Status:   Future     Number of Occurrences:   1     Standing Expiration Date:   7/9/2025     Order Specific Question:   Release to patient     Answer:   Immediate [1]    Basic metabolic panel     Standing Status:   Future     Number of Occurrences:   1     Standing Expiration Date:   7/9/2025     Order Specific Question:   Release to patient     Answer:   Immediate [1]     [facility] MUSE ECG 12 lead (clinic performed)     Scheduling Instructions:      PLEASE USE THIS ORDER FOR ECG'S PERFORMED IN PHYSICIAN OFFICES     Order Specific Question:   Release to patient     Answer:   Immediate [1]    Transthoracic echo (TTE) complete     Standing Status:   Future     Standing Expiration Date:   7/9/2026     Scheduling Instructions:      To schedule cardiology appointments with [facility], call Central Scheduling at [phone]. Thank you.     Order Specific Question:   Is contrast and/or agitated saline (bubbles) indicated for the study? (Contrast = Definity OR Lumason)     Answer:   No     Order Specific Question:   Release to patient     Answer:   Immediate     Order Specific Question:   Release to patient     Answer:   Immediate [1]       Follow Up Plans:  Return in about 3 months (around 10/9/2024).          I, Patrica Virgen, am scribing for, and in the presence of, Beena Smiley.    I, Beena Smiley, personally performed the services described in this documentation as scribed by Patrica Virgen in my presence, and it is both accurate and complete.     Beena Smiley MD  7/9/2024

## 2024-07-10 PROCEDURE — 88305 TISSUE EXAM BY PATHOLOGIST: CPT | Performed by: SURGERY

## 2024-07-10 PROCEDURE — 43239 EGD BIOPSY SINGLE/MULTIPLE: CPT | Performed by: SURGERY

## 2024-07-11 ENCOUNTER — LAB REQUISITION (OUTPATIENT)
Dept: LAB | Facility: HOSPITAL | Age: 64
End: 2024-07-11
Attending: SURGERY
Payer: COMMERCIAL

## 2024-07-11 DIAGNOSIS — K21.00 GASTRO-ESOPHAGEAL REFLUX DISEASE WITH ESOPHAGITIS, WITHOUT BLEEDING: ICD-10-CM

## 2024-07-15 ENCOUNTER — HOSPITAL ENCOUNTER (OUTPATIENT)
Dept: CARDIOLOGY | Facility: CLINIC | Age: 64
Discharge: HOME | End: 2024-07-15
Attending: INTERNAL MEDICINE
Payer: COMMERCIAL

## 2024-07-15 VITALS
SYSTOLIC BLOOD PRESSURE: 120 MMHG | WEIGHT: 150.13 LBS | HEIGHT: 63 IN | DIASTOLIC BLOOD PRESSURE: 74 MMHG | BODY MASS INDEX: 26.6 KG/M2

## 2024-07-15 DIAGNOSIS — R94.31 ABNORMAL EKG: ICD-10-CM

## 2024-07-15 LAB
AORTIC ROOT ANNULUS: 3 CM
ASCENDING AORTA: 3.4 CM
AV PEAK GRADIENT: 6 MMHG
AV PEAK VELOCITY-S: 1.18 M/S
AV REG PEAK VEL: 2.57 M/S
AV REGURGITATION PRESSURE HALF TIME: 1093 MS
AV VALVE AREA: 1.25 CM2
BSA FOR ECHO PROCEDURE: 1.74 M2
CASE RPRT: NORMAL
CUSP SEPARATION: 2.1 CM
E WAVE DECELERATION TIME: 337 MS
E/A RATIO: 0.6
E/E' RATIO: 17.3
E/LAT E' RATIO: 21.7
EDV (BP): 102 CM3
EF (A4C): 59.9 %
EF A2C: 49.3 %
EJECTION FRACTION: 52.7 %
EST RIGHT VENT SYSTOLIC PRESSURE BY TRICUSPID REGURGITATION JET: 22 MMHG
ESV (BP): 48.2 CM3
FRACTIONAL SHORTENING: 30.11 %
INTERVENTRICULAR SEPTUM: 0.89 CM
LA ESV (BP): 32.7 CM3
LA ESV INDEX (A2C): 15.69 CM3/M2
LA ESV INDEX (BP): 18.79 CM3/M2
LA/AORTA RATIO: 1.3
LAAS-AP2: 11.8 CM2
LAAS-AP4: 14.5 CM2
LAD 2D: 3.9 CM
LALD A4C: 4.09 CM
LALD A4C: 4.64 CM
LAV-S: 27.3 CM3
LEFT ATRIUM VOLUME INDEX: 19.94 CM3/M2
LEFT ATRIUM VOLUME: 34.7 CM3
LEFT INTERNAL DIMENSION IN SYSTOLE: 2.53 CM (ref 2.39–3.62)
LEFT VENTRICLE DIASTOLIC VOLUME INDEX: 61.49 CM3/M2
LEFT VENTRICLE DIASTOLIC VOLUME: 107 CM3
LEFT VENTRICLE SYSTOLIC VOLUME INDEX: 24.66 CM3/M2
LEFT VENTRICLE SYSTOLIC VOLUME: 42.9 CM3
LEFT VENTRICULAR INTERNAL DIMENSION IN DIASTOLE: 3.62 CM (ref 4.03–5.6)
LEFT VENTRICULAR POSTERIOR WALL IN END DIASTOLE: 1.09 CM (ref 0.53–0.98)
LV DIASTOLIC VOLUME: 94.3 CM3
LV ESV (APICAL 2 CHAMBER): 47.8 CM3
LVAD-AP2: 29.7 CM2
LVAD-AP4: 31.2 CM2
LVAS-AP2: 20.1 CM2
LVAS-AP4: 17.9 CM2
LVEDVI(A2C): 54.2 CM3/M2
LVEDVI(BP): 58.62 CM3/M2
LVESVI(A2C): 27.47 CM3/M2
LVESVI(BP): 27.7 CM3/M2
LVLD-AP2: 7.7 CM
LVLD-AP4: 7.45 CM
LVLS-AP2: 7.22 CM
LVLS-AP4: 6.32 CM
LVOT 2D: 2 CM
LVOT A: 3.14 CM2
LVOT PEAK VELOCITY: 0.6 M/S
LVOT PG: 1 MMHG
MLH CV ECHO AVA INDEX VELOCITY RATIO: 0.7
MV E'TISSUE VEL-LAT: 0.03 M/S
MV E'TISSUE VEL-MED: 0.03 M/S
MV PEAK A VEL: 0.95 M/S
MV PEAK E VEL: 0.59 M/S
MV STENOSIS PRESSURE HALF TIME: 99 MS
MV VALVE AREA P 1/2 METHOD: 2.22 CM2
PATH REPORT.ADDENDUM SPEC: NORMAL
PATH REPORT.FINAL DX SPEC: NORMAL
PATH REPORT.GROSS SPEC: NORMAL
PISA AR MAX VEL: 2.57 M/S
POSTERIOR WALL: 1.09 CM
PULMONARY REGURGITATION LATE DIASTOLIC VELOCITY: 0.96 M/S
PV PEAK GRADIENT: 2 MMHG
PV PV: 0.79 M/S
RAP: 3 MMHG
SEPTAL TISSUE DOPPLER FREE WALL LATE DIA VELOCITY (APICAL 4 CHAMBER VIEW): 0.11 M/S
TR MAX PG: 19.18 MMHG
TRICUSPID VALVE PEAK REGURGITATION VELOCITY: 2.19 M/S
Z-SCORE OF LEFT VENTRICULAR DIMENSION IN END DIASTOLE: -2.71
Z-SCORE OF LEFT VENTRICULAR DIMENSION IN END SYSTOLE: -1.19
Z-SCORE OF LEFT VENTRICULAR POSTERIOR WALL IN END DIASTOLE: 2.18

## 2024-07-15 PROCEDURE — 93306 TTE W/DOPPLER COMPLETE: CPT | Performed by: INTERNAL MEDICINE

## 2024-07-17 ENCOUNTER — RX ONLY (RX ONLY)
Age: 64
End: 2024-07-17

## 2024-07-17 ENCOUNTER — HOSPITAL ENCOUNTER (OUTPATIENT)
Dept: RADIOLOGY | Facility: HOSPITAL | Age: 64
Discharge: HOME | End: 2024-07-17
Attending: INTERNAL MEDICINE
Payer: COMMERCIAL

## 2024-07-17 VITALS
HEIGHT: 63 IN | WEIGHT: 145 LBS | BODY MASS INDEX: 25.69 KG/M2 | HEART RATE: 60 BPM | DIASTOLIC BLOOD PRESSURE: 72 MMHG | RESPIRATION RATE: 16 BRPM | OXYGEN SATURATION: 99 % | SYSTOLIC BLOOD PRESSURE: 137 MMHG

## 2024-07-17 DIAGNOSIS — R94.31 ABNORMAL EKG: ICD-10-CM

## 2024-07-17 PROCEDURE — 75574 CT ANGIO HRT W/3D IMAGE: CPT

## 2024-07-17 PROCEDURE — 63700000 HC SELF-ADMINISTRABLE DRUG: Performed by: INTERNAL MEDICINE

## 2024-07-17 PROCEDURE — 63600105 HC IODINE BASED CONTRAST: Performed by: INTERNAL MEDICINE

## 2024-07-17 RX ORDER — NITROGLYCERIN 0.4 MG/1
0.4 TABLET SUBLINGUAL EVERY 5 MIN PRN
Status: DISCONTINUED | OUTPATIENT
Start: 2024-07-17 | End: 2024-07-18 | Stop reason: HOSPADM

## 2024-07-17 RX ORDER — IOPAMIDOL 755 MG/ML
85 INJECTION, SOLUTION INTRAVASCULAR
Status: COMPLETED | OUTPATIENT
Start: 2024-07-17 | End: 2024-07-17

## 2024-07-17 RX ORDER — TRIAMCINOLONE ACETONIDE 1 MG/G
CREAM TOPICAL BID PRN
Qty: 80 | Refills: 3 | Status: ERX

## 2024-07-17 RX ADMIN — IOPAMIDOL 85 ML: 755 INJECTION, SOLUTION INTRAVENOUS at 13:08

## 2024-07-17 RX ADMIN — NITROGLYCERIN 0.4 MG: 0.4 TABLET SUBLINGUAL at 12:30

## 2024-07-17 NOTE — PROGRESS NOTES
Staff present during Radiology Nurse encounter:    Nurse: ALEX Ulrich  Technologist: Niesha    Cardiologist: ANTONIO Smith  Other: 20 g hi flow iv left ac 85 contrast  Cardiac CTA Worksheet    Chest pain (symptomatic patients):  Intermediate pre-test probability of Coronary Artery Disease        nitroglycerin (NITROSTAT) .4mg

## 2024-07-17 NOTE — PROGRESS NOTES
"Francine Tuttle   [id number]    Your doctor has referred you for a CT examination that requires the injection of an iodinated contrast material into your bloodstream. This iodinated contrast material, sometimes referred to as \"x-ray dye\" allows for better interpretation of the x-ray films or CT images and results in a more accurate interpretation of the examination.     Without the use of iodinated contrast (x-ray dye), the examination may be less informative and result in a suboptimal examination, and possibly a delay in diagnosis and, if needed, treatment.     The iodinated contrast material is given through a small needle or catheter placed into a vein, usually on the inside of the elbow, on the back of hand, or in a vein in the foot or lower leg.    The most common, though still rare, potential reaction to an intravenous contrast injection is an allergic-like reaction. Most allergic-like reactions are mild, though a small subset of people can have more severe reactions. Mild reactions include mild / scattered hives, itching, scratchy throat, sneezing and nasal congestion. More severe reactions include facial swelling, severe difficulty breathing, wheezing and anaphylactic shock. Those with a prior history allergic-like reaction to the same class of contrast media (such as CT contrast or MRI contrast) are at the highest risk for an allergic reaction.     If you believe you had an allergic reaction to contrast in the past, please let our staff know. We can determine if this increases your risk for a future reaction and provide steps to decrease the risk. This may delay your examination, but it decreases the risk of having a new and possibly more severe reaction to the contrast injection.    People with a history of prior allergic reactions to other substances (such as unrelated medications and food) and patients with a history of asthma have slightly increased risk for an allergic reaction from contrast " material when compared with the general population, but do not require to be pretreated prior to a contrast injection.    You should notify the physician, nurse or technologist if you have ever had any of these conditions or if you have any questions.

## 2024-07-17 NOTE — DISCHARGE INSTRUCTIONS
Francine Alyssa Tuttle   [id number]   07/17/24    Cardiac CTA Patient Discharge Instructions      Thank you for allowing our CT staff to participate in your care today.  We would like to provide you with some easy to follow instructions before you leave.    DRINK PLENTY OF FLUIDS  Now that your scan is complete, you will need to drink plenty of fluids, preferably water.    DO NOT drink any caffeinated beverages the rest of the day (coffee, tea, caffeinated sodas).    DO NOT drink any alcoholic beverages for the next 24 hours.  We ask you to drink these fluids to dilute the x-ray dye that was used for your scan and allow it to flush through your kidneys.  Caffeine and alcohol will not allow this flushing to occur effectively.       MEDICATION CHANGES:  {YES/NO/WILD CARDS:85278}    If you have any questions about this, please contact your primary care physician.    If you need immediate medical attention, please go to the nearest Emergency Department or dial 911.    By signing this form, I acknowledge these instructions have been explained to me to my satisfaction and all my questions have been answered.  I have received a copy of this form.

## 2024-08-15 ENCOUNTER — TRANSCRIBE ORDERS (OUTPATIENT)
Dept: SCHEDULING | Age: 64
End: 2024-08-15

## 2024-08-15 DIAGNOSIS — M54.12 RADICULOPATHY, CERVICAL REGION: Primary | ICD-10-CM

## 2024-08-16 ENCOUNTER — TELEPHONE (OUTPATIENT)
Dept: OTOLARYNGOLOGY | Facility: CLINIC | Age: 64
End: 2024-08-16
Payer: COMMERCIAL

## 2024-08-16 RX ORDER — MOMETASONE FUROATE 1 MG/G
CREAM TOPICAL DAILY
Qty: 45 G | Refills: 6 | Status: SHIPPED | OUTPATIENT
Start: 2024-08-16 | End: 2025-02-10

## 2024-08-19 RX ORDER — PANTOPRAZOLE SODIUM 40 MG/1
40 TABLET, DELAYED RELEASE ORAL 2 TIMES DAILY
Qty: 180 TABLET | Refills: 1 | Status: SHIPPED | OUTPATIENT
Start: 2024-08-19 | End: 2024-12-24

## 2024-08-25 ENCOUNTER — HOSPITAL ENCOUNTER (OUTPATIENT)
Dept: RADIOLOGY | Facility: HOSPITAL | Age: 64
Discharge: HOME | End: 2024-08-25
Attending: STUDENT IN AN ORGANIZED HEALTH CARE EDUCATION/TRAINING PROGRAM
Payer: COMMERCIAL

## 2024-08-25 DIAGNOSIS — M54.12 RADICULOPATHY, CERVICAL REGION: ICD-10-CM

## 2024-08-25 PROCEDURE — 72141 MRI NECK SPINE W/O DYE: CPT

## 2024-09-06 ENCOUNTER — APPOINTMENT (OUTPATIENT)
Dept: URBAN - METROPOLITAN AREA CLINIC 203 | Age: 64
Setting detail: DERMATOLOGY
End: 2024-09-13

## 2024-09-06 DIAGNOSIS — L98.8 OTHER SPECIFIED DISORDERS OF THE SKIN AND SUBCUTANEOUS TISSUE: ICD-10-CM

## 2024-09-06 PROCEDURE — OTHER JEUVEAU (U OR CC): OTHER

## 2024-09-06 ASSESSMENT — LOCATION SIMPLE DESCRIPTION DERM
LOCATION SIMPLE: LEFT FOREHEAD
LOCATION SIMPLE: RIGHT TEMPLE
LOCATION SIMPLE: RIGHT FOREHEAD
LOCATION SIMPLE: LEFT TEMPLE
LOCATION SIMPLE: GLABELLA

## 2024-09-06 ASSESSMENT — LOCATION DETAILED DESCRIPTION DERM
LOCATION DETAILED: RIGHT SUPERIOR FOREHEAD
LOCATION DETAILED: LEFT INFERIOR MEDIAL FOREHEAD
LOCATION DETAILED: LEFT SUPERIOR FOREHEAD
LOCATION DETAILED: RIGHT INFERIOR MEDIAL FOREHEAD
LOCATION DETAILED: RIGHT FOREHEAD
LOCATION DETAILED: LEFT SUPERIOR LATERAL FOREHEAD
LOCATION DETAILED: RIGHT INFERIOR TEMPLE
LOCATION DETAILED: GLABELLA
LOCATION DETAILED: LEFT INFERIOR TEMPLE
LOCATION DETAILED: LEFT MID TEMPLE

## 2024-09-06 ASSESSMENT — LOCATION ZONE DERM: LOCATION ZONE: FACE

## 2024-10-03 NOTE — PROGRESS NOTES
Cardiology  Office Progress Note         Reason for visit: No chief complaint on file.      JASON Tuttle is a 64 y.o. female who presents to the office for cardiovascular follow up and management of HTN, HLD, and abnormal EKG. Additional PMH of GERD. She also has a family history of CAD - dad was a doctor, carried around nitro.     She was last seen in the office on 7/9/24 for initial evaluation. She was feeling well and exercising regularly without any cardiac symptoms. EKG showed sinus, right axis deviation, atypical LBBB. I ordered an echo and CT coronaries. Her BP was well controlled on the 1/2 tab of lisinopril-hctz 10-12.5 mg daily. LDL was moderately elevated at 157 but Apo B and ASCVD risk score were not significantly elevated. I ordered a lipoprotein a and CAC Score (with CT coronaries). F/u in 3 months.     7/15/24 Echo showed EF 50-55%, abnormal septal motion c/w LBBB, G1DD.     7/17/24 CTA Coronary showed patent coronaries, small PFO, CAC score of 0.     Today she reports feeling well. On rare occasions, her BP drops to 110/70 or less and she has some lightheadedness. This does not occur often, maybe once a month. She checks BP daily and it averages 117/68. She exercises regularly without exertional symptoms.     Past Medical History:   Diagnosis Date    Allergic     Breast cancer (CMS/HCC)     right    GERD (gastroesophageal reflux disease)     ulcers    Hypertension     Lipid disorder        Past Surgical History   Procedure Laterality Date    Brain surgery  05/2017    Breast lumpectomy      Colonoscopy      2012       Social History     Tobacco Use    Smoking status: Former     Types: Cigarettes    Smokeless tobacco: Never    Tobacco comments:     Smoked when she was a teen    Vaping Use    Vaping status: Never Used   Substance Use Topics    Alcohol use: Yes     Comment: a couple of drinks daily    Drug use: No       Family History   Problem Relation Name Age of Onset     Hyperlipidemia Biological Father      Parkinsonism Biological Father      Hyperlipidemia Biological Sister      Heart attack Maternal Grandmother      Heart disease Maternal Grandmother      Colon cancer Neg Hx         Allergies:  Latex and Sulfasalazine    Current Outpatient Medications   Medication Sig Dispense Refill    bimatoprost (LATISSE) 0.03 % ophthalmic solution APPLY 1 GTT TO EACH EYELID Q NIGHT  3    lisinopriL-hydrochlorothiazide (PRINZIDE,ZESTORETIC) 10-12.5 mg per tablet Take 0.5 tablets by mouth daily. 90 tablet 3    mometasone (ELOCON) 0.1 % cream Apply topically daily. 45 g 6    pantoprazole (PROTONIX) 40 mg EC tablet Take 1 tablet (40 mg total) by mouth 2 (two) times a day. 180 tablet 1     No current facility-administered medications for this visit.       Review of Systems   Cardiovascular:  Negative for chest pain, dyspnea on exertion, leg swelling and palpitations.   Neurological:  Negative for dizziness and light-headedness.       Objective     Vitals:    10/16/24 0949   BP: 128/84   Pulse: 84   SpO2: 98%       Wt Readings from Last 3 Encounters:   10/16/24 67.1 kg (148 lb)   07/17/24 65.8 kg (145 lb)   07/15/24 68.1 kg (150 lb 2.1 oz)       Body mass index is 26.22 kg/m².    Physical Exam  Constitutional:       Appearance: Normal appearance.   Neck:      Vascular: No carotid bruit.   Cardiovascular:      Rate and Rhythm: Normal rate and regular rhythm.      Pulses: Normal pulses.      Heart sounds: Normal heart sounds. No murmur heard.  Pulmonary:      Effort: Pulmonary effort is normal. No respiratory distress.      Breath sounds: Normal breath sounds. No stridor. No wheezing, rhonchi or rales.   Musculoskeletal:         General: No swelling.   Skin:     General: Skin is warm and dry.   Neurological:      Mental Status: She is alert and oriented to person, place, and time.       ECG   Normal sinus rhythm  Possible Left atrial enlargement  Left axis deviation  Incomplete left bundle branch  block  Abnormal ECG  When compared with ECG of 09-JUL-2024 14:02,  Change in QRS axis     Hematology  Lab Results   Component Value Date    WBC 5.8 06/30/2020    HGB 13.6 06/30/2020    HCT 40.2 06/30/2020     06/30/2020       Chemistries  Lab Results   Component Value Date     06/10/2024    K 4.6 06/10/2024     06/10/2024    CREATININE 0.68 06/10/2024    BUN 24 06/10/2024    CO2 28 06/10/2024    GLUCOSE 91 06/10/2024    CALCIUM 9.4 06/10/2024    ALT 19 01/09/2024    AST 18 01/09/2024       Cholesterol  Lab Results   Component Value Date    CHOL 268 (H) 01/09/2024    TRIG 95 01/09/2024    HDL 91 01/09/2024    LDLCALC 157 (H) 01/09/2024       Endocrine  Lab Results   Component Value Date    TSH 2.01 09/18/2019       Cardiac Imaging    CTA CORONARY ARTERY 07/17/2024  CARDIAC FINDINGS  CORONARY ARTERIES  LEFT MAIN: Patent.  LAD: Patent.  The left anterior descending gives rise to two small diagonal  branches.  RAMUS: Large vessel.  Patent.  LCX: Patent.  The left circumflex artery terminates as an obtuse marginal  branch.  RCA: Dominant vessel which gives rise to the RPDA.  Patent.  CORONARY CALCIUM COMPOSITE AGATSTON SCORE: 0  LM: 0    LAD: 0    LCX: 0    RCA: 0  SEGMENT INVOLVEMENT SCORE:  TOTAL: 0    LM: 0    LAD: 0    LCX: 0    RCA: 0  PLAQUE BURDEN: None  CARDIAC MORPHOLOGY:  LEFT VENTRICLE: Normal left ventricular cavity size with normal wall thickness.  LEFT ATRIUM: Normal size.  There is an interatrial septal aneurysm which bows  toward the right suggestive of elevated left atrial pressure.  There is evidence  of a small PFO with left to right shunting..  LEFT ATRIAL APPENDAGE: Chicken wing morphology.  No evidence of thrombus.  RIGHT VENTRICLE: Normal size.  RIGHT ATRIUM: Normal.  AORTIC VALVE: Normal trileaflet aortic valve.  MITRAL VALVE: Normal.  TRICUSPID VALVE: Grossly normal.  PULMONIC VALVE: Grossly normal.  PERICARDIUM: Normal.  AORTA:  AORTIC ROOT: Normal.  SINUS OF VALSALVA:  Normal.  SINOTUBULAR JUNCTION: Normal.  ASCENDING THORACIC AORTA: Normal.  SUMMARY:  1. Patent coronary arteries.  2. There is an interatrial septal aneurysm which bows toward the right  suggestive of elevated left atrial pressure.  There is evidence of a small PFO  with left to right shunting.  3. Coronary calcium score 0. This places the patient in the MCLAIN (undetermined,  but very low) risk percentile for age and gender matched individuals. The  overall plaque burden is none.  4. Overall quality of the scan was good.    TRANSTHORACIC ECHO (TTE) COMPLETE 07/15/2024  Interpretation Summary    Left Ventricle: Normal ventricle size. Normal wall thickness. Estimated EF 50-55%. Wall motion appears grossly normal. Abnormal septal motion consistent with left bundle branch block. Grade I diastolic dysfunction.    Right Ventricle: Normal ventricle size. Normal systolic function.    Left Atrium: Normal sized atrium.    Aortic Valve: Tricuspid valve.  Sclerotic leaflets. Trace regurgitation. No stenosis.    Tricuspid Valve: Normal structure. Trace regurgitation. Estimated RVSP = 22 mmHg.          Assessment/Plan     Abnormal EKG  EKG shows sinus, right axis deviation, atypical LBBB. EKG today unchanged except for left axis deviation.  Patient has good exercise capacity and denies symptoms. She has rare dizziness less than once a month.  -Echo showed 7/15/24 EF 50-55%, abnormal septal motion c/w LBBB, G1DD.   -CTA Coronary 7/17/24 showed patent coronaries, small PFO, CAC score of 0.   -No further work up indicated at this time. If dizziness becomes more frequent, will obtain cardiac monitor.    Essential hypertension  -Blood pressure well controlled with Lisinopril-HCT 10-12.5 mg, 1/2 tab daily.     Mixed hyperlipidemia  FLP 1/9/24: , TRG 95, HDL 91,  (no medication).  No known FH of premature ASCVD- father passed 69 from stroke vs heart attack.   ApoB 104 mg/dl  The 10-year ASCVD risk score (Christian MCNAMARA, et al.,  2019) is: 5.7%  CTA Coronary 7/17/24 showed patent coronaries, CAC score of 0, reassuring.   -Moderately elevated LDL.  Apo B and ASCVD risk score are not significantly elevated. She did not obtain Lipoprotein(a).  -No compelling indication for statin at this time, continue to monitor lipid panel. She will get this checked with PMD before the next visit.       No orders of the defined types were placed in this encounter.      There are no discontinued medications.    Orders Placed This Encounter   Procedures    [facility] MUSE ECG 12 lead (clinic performed)     Scheduling Instructions:      PLEASE USE THIS ORDER FOR ECG'S PERFORMED IN PHYSICIAN OFFICES     Order Specific Question:   Release to patient     Answer:   Immediate [1]       Follow Up Plans:  Return in about 4 months (around 2/16/2025).              I, Skyla Smith, am scribing for, and in the presence of, Beena Smiley.    I, Beena Smiley, personally performed the services described in this documentation as scribed by Skyla Smith in my presence, and it is both accurate and complete.       Beena Smiley MD  10/16/2024

## 2024-10-16 ENCOUNTER — OFFICE VISIT (OUTPATIENT)
Dept: CARDIOLOGY | Facility: CLINIC | Age: 64
End: 2024-10-16
Payer: COMMERCIAL

## 2024-10-16 VITALS
BODY MASS INDEX: 26.22 KG/M2 | OXYGEN SATURATION: 98 % | DIASTOLIC BLOOD PRESSURE: 84 MMHG | HEIGHT: 63 IN | SYSTOLIC BLOOD PRESSURE: 128 MMHG | WEIGHT: 148 LBS | HEART RATE: 84 BPM

## 2024-10-16 DIAGNOSIS — I10 ESSENTIAL HYPERTENSION: Chronic | ICD-10-CM

## 2024-10-16 DIAGNOSIS — R94.31 ABNORMAL EKG: Primary | ICD-10-CM

## 2024-10-16 DIAGNOSIS — E78.2 MIXED HYPERLIPIDEMIA: ICD-10-CM

## 2024-10-16 LAB
ATRIAL RATE: 62
P AXIS: 59
PR INTERVAL: 162
QRS DURATION: 114
QT INTERVAL: 428
QTC CALCULATION(BAZETT): 434
R AXIS: -60
T WAVE AXIS: 83
VENTRICULAR RATE: 62

## 2024-10-16 PROCEDURE — 93000 ELECTROCARDIOGRAM COMPLETE: CPT | Performed by: INTERNAL MEDICINE

## 2024-10-16 PROCEDURE — 99214 OFFICE O/P EST MOD 30 MIN: CPT | Performed by: INTERNAL MEDICINE

## 2024-10-16 PROCEDURE — 3008F BODY MASS INDEX DOCD: CPT | Performed by: INTERNAL MEDICINE

## 2024-10-16 ASSESSMENT — ENCOUNTER SYMPTOMS
PALPITATIONS: 0
DYSPNEA ON EXERTION: 0
LIGHT-HEADEDNESS: 0
DIZZINESS: 0

## 2024-10-16 NOTE — LETTER
October 16, 2024     Ava Auguste,   [address] [place] [address] [place] [address]    Patient: Francine Tuttle  YOB: 1960  Date of Visit: 10/16/2024      Dear Dr. Auguste:    Thank you for referring Francine Tuttle to me for evaluation. Below are my notes for this consultation.    If you have questions, please do not hesitate to call me. I look forward to following your patient along with you.         Sincerely,        Beena Smiley MD        CC: No Recipients    Beena Smiley MD  10/16/2024 10:14 AM  Sign when Signing Visit       Cardiology  Office Progress Note         Reason for visit: No chief complaint on file.      HPI    Francine Tuttle is a 64 y.o. female who presents to the office for cardiovascular follow up and management of HTN, HLD, and abnormal EKG. Additional PMH of GERD. She also has a family history of CAD - dad was a doctor, carried around nitro.     She was last seen in the office on 7/9/24 for initial evaluation. She was feeling well and exercising regularly without any cardiac symptoms. EKG showed sinus, right axis deviation, atypical LBBB. I ordered an echo and CT coronaries. Her BP was well controlled on the 1/2 tab of lisinopril-hctz 10-12.5 mg daily. LDL was moderately elevated at 157 but Apo B and ASCVD risk score were not significantly elevated. I ordered a lipoprotein a and CAC Score (with CT coronaries). F/u in 3 months.     7/15/24 Echo showed EF 50-55%, abnormal septal motion c/w LBBB, G1DD.     7/17/24 CTA Coronary showed patent coronaries, small PFO, CAC score of 0.     Today she reports feeling well. On rare occasions, her BP drops to 110/70 or less and she has some lightheadedness. This does not occur often, maybe once a month. She checks BP daily and it averages 117/68. She exercises regularly without exertional symptoms.     Past Medical History:   Diagnosis Date   • Allergic    • Breast cancer (CMS/HCC)     right   • GERD  (gastroesophageal reflux disease)     ulcers   • Hypertension    • Lipid disorder        Past Surgical History   Procedure Laterality Date   • Brain surgery  05/2017   • Breast lumpectomy     • Colonoscopy      2012       Social History     Tobacco Use   • Smoking status: Former     Types: Cigarettes   • Smokeless tobacco: Never   • Tobacco comments:     Smoked when she was a teen    Vaping Use   • Vaping status: Never Used   Substance Use Topics   • Alcohol use: Yes     Comment: a couple of drinks daily   • Drug use: No       Family History   Problem Relation Name Age of Onset   • Hyperlipidemia Biological Father     • Parkinsonism Biological Father     • Hyperlipidemia Biological Sister     • Heart attack Maternal Grandmother     • Heart disease Maternal Grandmother     • Colon cancer Neg Hx         Allergies:  Latex and Sulfasalazine    Current Outpatient Medications   Medication Sig Dispense Refill   • bimatoprost (LATISSE) 0.03 % ophthalmic solution APPLY 1 GTT TO EACH EYELID Q NIGHT  3   • lisinopriL-hydrochlorothiazide (PRINZIDE,ZESTORETIC) 10-12.5 mg per tablet Take 0.5 tablets by mouth daily. 90 tablet 3   • mometasone (ELOCON) 0.1 % cream Apply topically daily. 45 g 6   • pantoprazole (PROTONIX) 40 mg EC tablet Take 1 tablet (40 mg total) by mouth 2 (two) times a day. 180 tablet 1     No current facility-administered medications for this visit.       Review of Systems   Cardiovascular:  Negative for chest pain, dyspnea on exertion, leg swelling and palpitations.   Neurological:  Negative for dizziness and light-headedness.       Objective    Vitals:    10/16/24 0949   BP: 128/84   Pulse: 84   SpO2: 98%       Wt Readings from Last 3 Encounters:   10/16/24 67.1 kg (148 lb)   07/17/24 65.8 kg (145 lb)   07/15/24 68.1 kg (150 lb 2.1 oz)       Body mass index is 26.22 kg/m².    Physical Exam  Constitutional:       Appearance: Normal appearance.   Neck:      Vascular: No carotid bruit.   Cardiovascular:       Rate and Rhythm: Normal rate and regular rhythm.      Pulses: Normal pulses.      Heart sounds: Normal heart sounds. No murmur heard.  Pulmonary:      Effort: Pulmonary effort is normal. No respiratory distress.      Breath sounds: Normal breath sounds. No stridor. No wheezing, rhonchi or rales.   Musculoskeletal:         General: No swelling.   Skin:     General: Skin is warm and dry.   Neurological:      Mental Status: She is alert and oriented to person, place, and time.       ECG   Normal sinus rhythm  Possible Left atrial enlargement  Left axis deviation  Incomplete left bundle branch block  Abnormal ECG  When compared with ECG of 09-JUL-2024 14:02,  Change in QRS axis     Hematology  Lab Results   Component Value Date    WBC 5.8 06/30/2020    HGB 13.6 06/30/2020    HCT 40.2 06/30/2020     06/30/2020       Chemistries  Lab Results   Component Value Date     06/10/2024    K 4.6 06/10/2024     06/10/2024    CREATININE 0.68 06/10/2024    BUN 24 06/10/2024    CO2 28 06/10/2024    GLUCOSE 91 06/10/2024    CALCIUM 9.4 06/10/2024    ALT 19 01/09/2024    AST 18 01/09/2024       Cholesterol  Lab Results   Component Value Date    CHOL 268 (H) 01/09/2024    TRIG 95 01/09/2024    HDL 91 01/09/2024    LDLCALC 157 (H) 01/09/2024       Endocrine  Lab Results   Component Value Date    TSH 2.01 09/18/2019       Cardiac Imaging    CTA CORONARY ARTERY 07/17/2024  CARDIAC FINDINGS  CORONARY ARTERIES  LEFT MAIN: Patent.  LAD: Patent.  The left anterior descending gives rise to two small diagonal  branches.  RAMUS: Large vessel.  Patent.  LCX: Patent.  The left circumflex artery terminates as an obtuse marginal  branch.  RCA: Dominant vessel which gives rise to the RPDA.  Patent.  CORONARY CALCIUM COMPOSITE AGATSTON SCORE: 0  LM: 0    LAD: 0    LCX: 0    RCA: 0  SEGMENT INVOLVEMENT SCORE:  TOTAL: 0    LM: 0    LAD: 0    LCX: 0    RCA: 0  PLAQUE BURDEN: None  CARDIAC MORPHOLOGY:  LEFT VENTRICLE: Normal left  ventricular cavity size with normal wall thickness.  LEFT ATRIUM: Normal size.  There is an interatrial septal aneurysm which bows  toward the right suggestive of elevated left atrial pressure.  There is evidence  of a small PFO with left to right shunting..  LEFT ATRIAL APPENDAGE: Chicken wing morphology.  No evidence of thrombus.  RIGHT VENTRICLE: Normal size.  RIGHT ATRIUM: Normal.  AORTIC VALVE: Normal trileaflet aortic valve.  MITRAL VALVE: Normal.  TRICUSPID VALVE: Grossly normal.  PULMONIC VALVE: Grossly normal.  PERICARDIUM: Normal.  AORTA:  AORTIC ROOT: Normal.  SINUS OF VALSALVA: Normal.  SINOTUBULAR JUNCTION: Normal.  ASCENDING THORACIC AORTA: Normal.  SUMMARY:  1. Patent coronary arteries.  2. There is an interatrial septal aneurysm which bows toward the right  suggestive of elevated left atrial pressure.  There is evidence of a small PFO  with left to right shunting.  3. Coronary calcium score 0. This places the patient in the MCLAIN (undetermined,  but very low) risk percentile for age and gender matched individuals. The  overall plaque burden is none.  4. Overall quality of the scan was good.    TRANSTHORACIC ECHO (TTE) COMPLETE 07/15/2024  Interpretation Summary  •  Left Ventricle: Normal ventricle size. Normal wall thickness. Estimated EF 50-55%. Wall motion appears grossly normal. Abnormal septal motion consistent with left bundle branch block. Grade I diastolic dysfunction.  •  Right Ventricle: Normal ventricle size. Normal systolic function.  •  Left Atrium: Normal sized atrium.  •  Aortic Valve: Tricuspid valve.  Sclerotic leaflets. Trace regurgitation. No stenosis.  •  Tricuspid Valve: Normal structure. Trace regurgitation. Estimated RVSP = 22 mmHg.          Assessment/Plan    Abnormal EKG  EKG shows sinus, right axis deviation, atypical LBBB. EKG today unchanged except for left axis deviation.  Patient has good exercise capacity and denies symptoms. She has rare dizziness less than once a  month.  -Echo showed 7/15/24 EF 50-55%, abnormal septal motion c/w LBBB, G1DD.   -CTA Coronary 7/17/24 showed patent coronaries, small PFO, CAC score of 0.   -No further work up indicated at this time. If dizziness becomes more frequent, will obtain cardiac monitor.    Essential hypertension  -Blood pressure well controlled with Lisinopril-HCT 10-12.5 mg, 1/2 tab daily.     Mixed hyperlipidemia  FLP 1/9/24: , TRG 95, HDL 91,  (no medication).  No known FH of premature ASCVD- father passed 69 from stroke vs heart attack.   ApoB 104 mg/dl  The 10-year ASCVD risk score (Christian DK, et al., 2019) is: 5.7%  CTA Coronary 7/17/24 showed patent coronaries, CAC score of 0, reassuring.   -Moderately elevated LDL.  Apo B and ASCVD risk score are not significantly elevated. She did not obtain Lipoprotein(a).  -No compelling indication for statin at this time, continue to monitor lipid panel. She will get this checked with PMD before the next visit.       No orders of the defined types were placed in this encounter.      There are no discontinued medications.    Orders Placed This Encounter   Procedures   • [facility] MUSE ECG 12 lead (clinic performed)     Scheduling Instructions:      PLEASE USE THIS ORDER FOR ECG'S PERFORMED IN PHYSICIAN OFFICES     Order Specific Question:   Release to patient     Answer:   Immediate [1]       Follow Up Plans:  Return in about 4 months (around 2/16/2025).              I, Skyla Smith, am scribing for, and in the presence of, Beena Smiley.    I, Beena Smiley, personally performed the services described in this documentation as scribed by Skyla Smith in my presence, and it is both accurate and complete.       Beena Smiley MD  10/16/2024

## 2024-10-16 NOTE — ASSESSMENT & PLAN NOTE
FLP 1/9/24: , TRG 95, HDL 91,  (no medication).  No known FH of premature ASCVD- father passed 69 from stroke vs heart attack.   ApoB 104 mg/dl  The 10-year ASCVD risk score (Christian MCNAMARA, et al., 2019) is: 5.7%  CTA Coronary 7/17/24 showed patent coronaries, CAC score of 0, reassuring.   -Moderately elevated LDL.  Apo B and ASCVD risk score are not significantly elevated. She did not obtain Lipoprotein(a).  -No compelling indication for statin at this time, continue to monitor lipid panel. She will get this checked with PMD before the next visit.

## 2024-10-16 NOTE — ASSESSMENT & PLAN NOTE
EKG shows sinus, right axis deviation, atypical LBBB. EKG today unchanged except for left axis deviation.  Patient has good exercise capacity and denies symptoms. She has rare dizziness less than once a month.  -Echo showed 7/15/24 EF 50-55%, abnormal septal motion c/w LBBB, G1DD.   -CTA Coronary 7/17/24 showed patent coronaries, small PFO, CAC score of 0.   -No further work up indicated at this time. If dizziness becomes more frequent, will obtain cardiac monitor.

## 2024-12-04 ENCOUNTER — HOSPITAL ENCOUNTER (OUTPATIENT)
Dept: RADIOLOGY | Age: 64
Discharge: HOME | End: 2024-12-04
Attending: FAMILY MEDICINE
Payer: COMMERCIAL

## 2024-12-04 DIAGNOSIS — Z12.31 SCREENING MAMMOGRAM FOR BREAST CANCER: ICD-10-CM

## 2024-12-04 LAB
BUN SERPL-MCNC: 14 MG/DL (ref 7–25)
BUN/CREAT SERPL: NORMAL (CALC) (ref 6–22)
CALCIUM SERPL-MCNC: 9.6 MG/DL (ref 8.6–10.4)
CHLORIDE SERPL-SCNC: 103 MMOL/L (ref 98–110)
CO2 SERPL-SCNC: 30 MMOL/L (ref 20–32)
CREAT SERPL-MCNC: 0.64 MG/DL (ref 0.5–1.05)
EGFRCR SERPLBLD CKD-EPI 2021: 100 ML/MIN/1.73M2
GLUCOSE SERPL-MCNC: 94 MG/DL (ref 65–99)
LPA SERPL-SCNC: 55 NMOL/L
POTASSIUM SERPL-SCNC: 4.4 MMOL/L (ref 3.5–5.3)
SODIUM SERPL-SCNC: 141 MMOL/L (ref 135–146)

## 2024-12-04 PROCEDURE — 77067 SCR MAMMO BI INCL CAD: CPT

## 2024-12-06 ENCOUNTER — APPOINTMENT (OUTPATIENT)
Dept: URBAN - METROPOLITAN AREA CLINIC 203 | Age: 64
Setting detail: DERMATOLOGY
End: 2024-12-13

## 2024-12-06 DIAGNOSIS — L98.8 OTHER SPECIFIED DISORDERS OF THE SKIN AND SUBCUTANEOUS TISSUE: ICD-10-CM

## 2024-12-06 PROCEDURE — OTHER JEUVEAU (U OR CC): OTHER

## 2024-12-06 ASSESSMENT — LOCATION DETAILED DESCRIPTION DERM
LOCATION DETAILED: LEFT LATERAL EYEBROW
LOCATION DETAILED: RIGHT LATERAL FOREHEAD
LOCATION DETAILED: RIGHT MEDIAL EYEBROW
LOCATION DETAILED: RIGHT SUPERIOR FOREHEAD
LOCATION DETAILED: LEFT LATERAL FOREHEAD
LOCATION DETAILED: LEFT MEDIAL EYEBROW
LOCATION DETAILED: LEFT INFERIOR FOREHEAD
LOCATION DETAILED: RIGHT INFERIOR FOREHEAD
LOCATION DETAILED: RIGHT LATERAL EYEBROW
LOCATION DETAILED: LEFT SUPERIOR FOREHEAD

## 2024-12-06 ASSESSMENT — LOCATION SIMPLE DESCRIPTION DERM
LOCATION SIMPLE: LEFT FOREHEAD
LOCATION SIMPLE: LEFT EYEBROW
LOCATION SIMPLE: RIGHT EYEBROW
LOCATION SIMPLE: RIGHT FOREHEAD

## 2024-12-06 ASSESSMENT — LOCATION ZONE DERM: LOCATION ZONE: FACE

## 2024-12-06 NOTE — PROCEDURE: JEUVEAU (U OR CC)
Show Inventory Tab: Show
Detail Level: Detailed
Dilution (U/0.1 Cc): 4
Post-Care Instructions: Patient instructed to not lie down for 4 hours and limit physical activity for 24 hours.
Measure In Units Or Cc's?: units
Consent: Written consent obtained. Risks include but not limited to lid/brow ptosis, bruising, swelling, diplopia, temporary effect, incomplete chemical denervation.
Quantity Per Injection Site (Units): 2

## 2024-12-24 RX ORDER — PANTOPRAZOLE SODIUM 40 MG/1
40 TABLET, DELAYED RELEASE ORAL 2 TIMES DAILY
Qty: 180 TABLET | Refills: 3 | Status: SHIPPED | OUTPATIENT
Start: 2024-12-24

## 2025-01-15 PROBLEM — T85.43XA: Status: RESOLVED | Noted: 2024-06-05 | Resolved: 2025-01-15

## 2025-01-16 ENCOUNTER — OFFICE VISIT (OUTPATIENT)
Dept: PRIMARY CARE | Facility: CLINIC | Age: 65
End: 2025-01-16
Payer: COMMERCIAL

## 2025-01-16 VITALS
TEMPERATURE: 96.5 F | BODY MASS INDEX: 23.85 KG/M2 | OXYGEN SATURATION: 97 % | SYSTOLIC BLOOD PRESSURE: 110 MMHG | HEART RATE: 61 BPM | DIASTOLIC BLOOD PRESSURE: 70 MMHG | WEIGHT: 148.4 LBS | HEIGHT: 66 IN

## 2025-01-16 DIAGNOSIS — F33.41 RECURRENT MAJOR DEPRESSIVE DISORDER, IN PARTIAL REMISSION (CMS/HCC): Chronic | ICD-10-CM

## 2025-01-16 DIAGNOSIS — Z00.00 ENCOUNTER FOR GENERAL ADULT MEDICAL EXAMINATION WITHOUT ABNORMAL FINDINGS: Primary | ICD-10-CM

## 2025-01-16 PROCEDURE — 3008F BODY MASS INDEX DOCD: CPT | Performed by: FAMILY MEDICINE

## 2025-01-16 PROCEDURE — 99396 PREV VISIT EST AGE 40-64: CPT | Performed by: FAMILY MEDICINE

## 2025-01-16 ASSESSMENT — ENCOUNTER SYMPTOMS
ACTIVITY CHANGE: 0
DIFFICULTY URINATING: 0
AGITATION: 1
CONSTIPATION: 0
DIARRHEA: 0
ABDOMINAL PAIN: 0

## 2025-01-16 ASSESSMENT — PATIENT HEALTH QUESTIONNAIRE - PHQ9: SUM OF ALL RESPONSES TO PHQ9 QUESTIONS 1 & 2: 0

## 2025-01-16 NOTE — PROGRESS NOTES
"Subjective      Patient ID: Francine Tuttle is a 64 y.o. female.  1960      Well visit          The following have been reviewed and updated as appropriate in this visit:   Tobacco  Allergies  Meds  Problems  Med Hx  Surg Hx  Fam Hx  Soc   Hx      Review of Systems   Constitutional:  Negative for activity change.   Cardiovascular:  Negative for leg swelling.   Gastrointestinal:  Negative for abdominal pain, constipation and diarrhea.   Genitourinary:  Negative for difficulty urinating.   Psychiatric/Behavioral:  Positive for agitation.    All other systems reviewed and are negative.      Objective     Vitals:    01/16/25 1032 01/16/25 1035 01/16/25 1042   BP: (!) 149/84 (!) 144/90 110/70   BP Location: Right upper arm     Patient Position: Sitting     Pulse: 61     Temp: (!) 35.8 °C (96.5 °F)     TempSrc: Temporal     SpO2: 97%     Weight: 67.3 kg (148 lb 6.4 oz)     Height: 1.664 m (5' 5.5\")       Body mass index is 24.32 kg/m².    Physical Exam  Vitals and nursing note reviewed.   Constitutional:       General: She is not in acute distress.     Appearance: Normal appearance. She is not ill-appearing, toxic-appearing or diaphoretic.   HENT:      Right Ear: Tympanic membrane, ear canal and external ear normal. There is no impacted cerumen.      Left Ear: Tympanic membrane, ear canal and external ear normal. There is no impacted cerumen.      Nose: Nose normal.      Mouth/Throat:      Mouth: Mucous membranes are moist.      Pharynx: Oropharynx is clear. No oropharyngeal exudate or posterior oropharyngeal erythema.   Eyes:      General: No scleral icterus.        Right eye: No discharge.         Left eye: No discharge.      Conjunctiva/sclera: Conjunctivae normal.   Cardiovascular:      Rate and Rhythm: Normal rate and regular rhythm.      Heart sounds: No murmur heard.     No gallop.   Pulmonary:      Effort: Pulmonary effort is normal. No respiratory distress.      Breath sounds: Normal breath " sounds. No stridor. No wheezing or rhonchi.   Abdominal:      General: There is no distension.      Palpations: Abdomen is soft. There is no mass.      Tenderness: There is no abdominal tenderness. There is no guarding or rebound.   Musculoskeletal:      Cervical back: Neck supple. No rigidity or tenderness.      Right lower leg: No edema.      Left lower leg: No edema.   Lymphadenopathy:      Cervical: No cervical adenopathy.   Skin:     General: Skin is warm and dry.      Coloration: Skin is not jaundiced or pale.   Neurological:      General: No focal deficit present.      Mental Status: She is alert and oriented to person, place, and time. Mental status is at baseline.      Deep Tendon Reflexes: Reflexes normal.   Psychiatric:         Mood and Affect: Mood normal.         Behavior: Behavior normal.         Thought Content: Thought content normal.         Judgment: Judgment normal.         Assessment & Plan  Encounter for general adult medical examination without abnormal findings  Age appropriate AG provided to pt  Fast labs utd  Seeing cards next month ?statin  Imm are utd    Cscope, mammo, pap are utd    Resee one year       Recurrent major depressive disorder, in partial remission (CMS/[facility])  Seeing therapist  Exercises regularly

## 2025-01-16 NOTE — ASSESSMENT & PLAN NOTE
Age appropriate AG provided to pt  Fast labs utd  Seeing cards next month ?statin  Imm are utd    Cscope, mammo, pap are utd    Resee one year

## 2025-01-20 SDOH — ECONOMIC STABILITY: INCOME INSECURITY: IN THE LAST 12 MONTHS, WAS THERE A TIME WHEN YOU WERE NOT ABLE TO PAY THE MORTGAGE OR RENT ON TIME?: NO

## 2025-01-20 SDOH — ECONOMIC STABILITY: FOOD INSECURITY: WITHIN THE PAST 12 MONTHS, THE FOOD YOU BOUGHT JUST DIDN'T LAST AND YOU DIDN'T HAVE MONEY TO GET MORE.: NEVER TRUE

## 2025-01-20 SDOH — ECONOMIC STABILITY: TRANSPORTATION INSECURITY
IN THE PAST 12 MONTHS, HAS THE LACK OF TRANSPORTATION KEPT YOU FROM MEDICAL APPOINTMENTS OR FROM GETTING MEDICATIONS?: NO

## 2025-01-20 SDOH — ECONOMIC STABILITY: FOOD INSECURITY: WITHIN THE PAST 12 MONTHS, YOU WORRIED THAT YOUR FOOD WOULD RUN OUT BEFORE YOU GOT MONEY TO BUY MORE.: NEVER TRUE

## 2025-01-20 SDOH — ECONOMIC STABILITY: TRANSPORTATION INSECURITY
IN THE PAST 12 MONTHS, HAS LACK OF TRANSPORTATION KEPT YOU FROM MEETINGS, WORK, OR FROM GETTING THINGS NEEDED FOR DAILY LIVING?: NO

## 2025-01-20 ASSESSMENT — SOCIAL DETERMINANTS OF HEALTH (SDOH): IN THE PAST 12 MONTHS, HAS THE ELECTRIC, GAS, OIL, OR WATER COMPANY THREATENED TO SHUT OFF SERVICE IN YOUR HOME?: NO

## 2025-01-25 PROBLEM — S22.31XA: Status: RESOLVED | Noted: 2024-06-13 | Resolved: 2025-01-25

## 2025-01-27 ENCOUNTER — OFFICE VISIT (OUTPATIENT)
Dept: PRIMARY CARE | Facility: CLINIC | Age: 65
End: 2025-01-27
Payer: COMMERCIAL

## 2025-01-27 VITALS
SYSTOLIC BLOOD PRESSURE: 130 MMHG | BODY MASS INDEX: 24.33 KG/M2 | OXYGEN SATURATION: 99 % | WEIGHT: 151.4 LBS | HEART RATE: 61 BPM | HEIGHT: 66 IN | DIASTOLIC BLOOD PRESSURE: 71 MMHG | TEMPERATURE: 97.7 F

## 2025-01-27 DIAGNOSIS — Z12.4 SCREENING FOR CERVICAL CANCER: Primary | ICD-10-CM

## 2025-01-27 PROCEDURE — S0612 ANNUAL GYNECOLOGICAL EXAMINA: HCPCS | Performed by: FAMILY MEDICINE

## 2025-01-27 NOTE — PROGRESS NOTES
"Subjective      Patient ID: Francine Tuttle is a 64 y.o. female.  1960      Here for pap    Annual GYN Exam  The patient's pertinent negatives include no pelvic pain or vaginal discharge.       The following have been reviewed and updated as appropriate in this visit:   Meds  Problems       Review of Systems   Genitourinary:  Negative for genital sores, pelvic pain, vaginal bleeding, vaginal discharge and vaginal pain.   All other systems reviewed and are negative.      Objective     Vitals:    01/27/25 1320   BP: 130/71   BP Location: Right upper arm   Patient Position: Sitting   Pulse: 61   Temp: 36.5 °C (97.7 °F)   TempSrc: Temporal   SpO2: 99%   Weight: 68.7 kg (151 lb 6.4 oz)   Height: 1.664 m (5' 5.5\")     Body mass index is 24.81 kg/m².    Physical Exam  Vitals and nursing note reviewed.   Constitutional:       General: She is not in acute distress.     Appearance: Normal appearance. She is not ill-appearing, toxic-appearing or diaphoretic.   Genitourinary:        General: Normal vulva.      Vagina: No vaginal discharge.   Neurological:      Mental Status: She is alert.   Psychiatric:         Mood and Affect: Mood normal.         Behavior: Behavior normal.         Assessment & Plan  Screening for cervical cancer  Screening pap performed.  Mammo, cscope utd  Bp controlled.    Orders:    Cytology, Thinprep Pap          "

## 2025-01-27 NOTE — ASSESSMENT & PLAN NOTE
Screening pap performed.  Mammo, cscope utd  Bp controlled.    Orders:    Cytology, Thinprep Pap

## 2025-02-10 ENCOUNTER — OFFICE VISIT (OUTPATIENT)
Dept: CARDIOLOGY | Facility: CLINIC | Age: 65
End: 2025-02-10
Payer: COMMERCIAL

## 2025-02-10 VITALS
HEIGHT: 65 IN | WEIGHT: 150 LBS | HEART RATE: 86 BPM | DIASTOLIC BLOOD PRESSURE: 84 MMHG | OXYGEN SATURATION: 97 % | BODY MASS INDEX: 24.99 KG/M2 | SYSTOLIC BLOOD PRESSURE: 130 MMHG

## 2025-02-10 DIAGNOSIS — E78.2 MIXED HYPERLIPIDEMIA: ICD-10-CM

## 2025-02-10 DIAGNOSIS — I10 ESSENTIAL HYPERTENSION: ICD-10-CM

## 2025-02-10 DIAGNOSIS — R94.31 ABNORMAL EKG: Primary | ICD-10-CM

## 2025-02-10 LAB
ATRIAL RATE: 67
P AXIS: 67
PR INTERVAL: 126
QRS DURATION: 122
QT INTERVAL: 424
QTC CALCULATION(BAZETT): 448
R AXIS: 137
T WAVE AXIS: 44
VENTRICULAR RATE: 67

## 2025-02-10 PROCEDURE — 3008F BODY MASS INDEX DOCD: CPT | Performed by: INTERNAL MEDICINE

## 2025-02-10 PROCEDURE — 99214 OFFICE O/P EST MOD 30 MIN: CPT | Performed by: INTERNAL MEDICINE

## 2025-02-10 PROCEDURE — 93000 ELECTROCARDIOGRAM COMPLETE: CPT | Performed by: INTERNAL MEDICINE

## 2025-02-10 ASSESSMENT — ENCOUNTER SYMPTOMS
LIGHT-HEADEDNESS: 0
DYSPNEA ON EXERTION: 0
DIZZINESS: 0
PALPITATIONS: 0

## 2025-02-10 NOTE — PROGRESS NOTES
Cardiology  Office Progress Note         Reason for visit:   Chief Complaint   Patient presents with    Follow-up       HPI     Francine Tuttle is a 64 y.o. female who presents to the office for cardiovascular follow up and management of HTN, HLD, and abnormal EKG. Additional PMH of GERD. She also has a family history of CAD - dad was a doctor, carried around nitro.      She was last seen in the office on 10/16/24. She was feeling well and exercising regularly without any cardiac symptoms. BP was averaging 117/68 at home and she had occasional lightheadedness. She was to have a FLP with her PMD and f/u here in 4 months.     12/3/24 LP(a) 55.    12/6/24 FLP: , TG 93, HDL 48, .    Today she reports feeling well. She has been tolerating her medications without any adverse reactions. She denies any chest pain, shortness of breath, palpitations, lightheadedness, dizziness or syncopal events. She continues to exercise regularly without any cardiac limitations.             Past Medical History:   Diagnosis Date    Allergic     Breast cancer (CMS/HCC)     right    GERD (gastroesophageal reflux disease)     ulcers    Hypertension     Lipid disorder        Past Surgical History   Procedure Laterality Date    Brain surgery  05/2017    Breast lumpectomy      Colonoscopy      2012       Social History     Tobacco Use    Smoking status: Former     Types: Cigarettes    Smokeless tobacco: Never    Tobacco comments:     Smoked when she was a teen    Vaping Use    Vaping status: Never Used   Substance Use Topics    Alcohol use: Yes     Comment: a couple of drinks daily    Drug use: No       Family History   Problem Relation Name Age of Onset    Hyperlipidemia Biological Father      Parkinsonism Biological Father      Hyperlipidemia Biological Sister      Heart attack Maternal Grandmother      Heart disease Maternal Grandmother      Colon cancer Neg Hx         Allergies:  Latex and Sulfasalazine    Current  Outpatient Medications   Medication Sig Dispense Refill    bimatoprost (LATISSE) 0.03 % ophthalmic solution APPLY 1 GTT TO EACH EYELID Q NIGHT  3    lisinopriL-hydrochlorothiazide (PRINZIDE,ZESTORETIC) 10-12.5 mg per tablet Take 0.5 tablets by mouth daily. 90 tablet 3    mometasone (ELOCON) 0.1 % cream Apply topically daily. 45 g 6    pantoprazole (PROTONIX) 40 mg EC tablet TAKE 1 TABLET TWICE A DAY (Patient taking differently: Take 40 mg by mouth nightly.) 180 tablet 3     No current facility-administered medications for this visit.       Review of Systems   Cardiovascular:  Negative for chest pain, dyspnea on exertion, leg swelling and palpitations.   Neurological:  Negative for dizziness and light-headedness.       Objective     Vitals:    02/10/25 1009   BP: 130/84   Pulse: 86   SpO2: 97%       Wt Readings from Last 3 Encounters:   02/10/25 68 kg (150 lb)   01/27/25 68.7 kg (151 lb 6.4 oz)   01/16/25 67.3 kg (148 lb 6.4 oz)       Body mass index is 24.96 kg/m².    Physical Exam  Constitutional:       Appearance: Normal appearance.   Neck:      Vascular: No carotid bruit.   Cardiovascular:      Rate and Rhythm: Normal rate and regular rhythm.      Pulses: Normal pulses.      Heart sounds: Normal heart sounds. No murmur heard.  Pulmonary:      Effort: Pulmonary effort is normal. No respiratory distress.      Breath sounds: Normal breath sounds. No stridor. No wheezing, rhonchi or rales.   Musculoskeletal:         General: No swelling.   Skin:     General: Skin is warm and dry.   Neurological:      Mental Status: She is alert and oriented to person, place, and time.         ECG   Normal sinus rhythm  Right axis deviation  Left ventricular hypertrophy with QRS widening and repolarization abnormality ( Jesus product )  Abnormal ECG  When compared with ECG of 16-OCT-2024 09:57,  Questionable change in QRS axis     Hematology  Lab Results   Component Value Date    WBC 5.8 06/30/2020    HGB 13.6 06/30/2020    HCT 40.2  06/30/2020     06/30/2020       Chemistries  Lab Results   Component Value Date     12/03/2024    K 4.4 12/03/2024     12/03/2024    CREATININE 0.64 12/03/2024    BUN 14 12/03/2024    CO2 30 12/03/2024    GLUCOSE 94 12/03/2024    CALCIUM 9.6 12/03/2024    ALT 19 01/09/2024    AST 18 01/09/2024       Cholesterol  Lab Results   Component Value Date    CHOL 227 (H) 12/06/2024    TRIG 93 12/06/2024    HDL 48 (L) 12/06/2024    LDLCALC 159 (H) 12/06/2024       Endocrine  Lab Results   Component Value Date    TSH 2.01 09/18/2019       Cardiac Imaging    CTA CORONARY ARTERY 07/17/2024  CARDIAC FINDINGS  CORONARY ARTERIES  LEFT MAIN: Patent.  LAD: Patent.  The left anterior descending gives rise to two small diagonal  branches.  RAMUS: Large vessel.  Patent.  LCX: Patent.  The left circumflex artery terminates as an obtuse marginal  branch.  RCA: Dominant vessel which gives rise to the RPDA.  Patent.  CORONARY CALCIUM COMPOSITE AGATSTON SCORE: 0  LM: 0    LAD: 0    LCX: 0    RCA: 0  SEGMENT INVOLVEMENT SCORE:  TOTAL: 0    LM: 0    LAD: 0    LCX: 0    RCA: 0  PLAQUE BURDEN: None  CARDIAC MORPHOLOGY:  LEFT VENTRICLE: Normal left ventricular cavity size with normal wall thickness.  LEFT ATRIUM: Normal size.  There is an interatrial septal aneurysm which bows  toward the right suggestive of elevated left atrial pressure.  There is evidence  of a small PFO with left to right shunting..  LEFT ATRIAL APPENDAGE: Chicken wing morphology.  No evidence of thrombus.  RIGHT VENTRICLE: Normal size.  RIGHT ATRIUM: Normal.  AORTIC VALVE: Normal trileaflet aortic valve.  MITRAL VALVE: Normal.  TRICUSPID VALVE: Grossly normal.  PULMONIC VALVE: Grossly normal.  PERICARDIUM: Normal.  AORTA:  AORTIC ROOT: Normal.  SINUS OF VALSALVA: Normal.  SINOTUBULAR JUNCTION: Normal.  ASCENDING THORACIC AORTA: Normal.  SUMMARY:  1. Patent coronary arteries.  2. There is an interatrial septal aneurysm which bows toward the right  suggestive  of elevated left atrial pressure.  There is evidence of a small PFO  with left to right shunting.  3. Coronary calcium score 0. This places the patient in the MCLAIN (undetermined,  but very low) risk percentile for age and gender matched individuals. The  overall plaque burden is none.  4. Overall quality of the scan was good.     TRANSTHORACIC ECHO (TTE) COMPLETE 07/15/2024  Interpretation Summary    Left Ventricle: Normal ventricle size. Normal wall thickness. Estimated EF 50-55%. Wall motion appears grossly normal. Abnormal septal motion consistent with left bundle branch block. Grade I diastolic dysfunction.    Right Ventricle: Normal ventricle size. Normal systolic function.    Left Atrium: Normal sized atrium.    Aortic Valve: Tricuspid valve.  Sclerotic leaflets. Trace regurgitation. No stenosis.    Tricuspid Valve: Normal structure. Trace regurgitation. Estimated RVSP = 22 mmHg.        Assessment/Plan     Abnormal EKG  EKG shows sinus, right axis deviation, atypical LBBB. EKG today unchanged except for left axis deviation.  Patient has good exercise capacity and denies symptoms. .  -Echo showed 7/15/24 EF 50-55%, abnormal septal motion c/w LBBB, G1DD.   -CTA Coronary 7/17/24 showed patent coronaries, small PFO, CAC score of 0.   -No further work up indicated at this time.    Essential hypertension  Blood pressure well controlled with Lisinopril-HCT 10-12.5 mg, 1/2 tab daily.   -Continue same.    Mixed hyperlipidemia  1/9/24 FLP: , TRG 95, HDL 91,  (no medication).  12/6/24 FLP: , TG 93, HDL 48, . LP(a) 55.  No known FH of premature ASCVD- father passed 69 from stroke vs heart attack.   ApoB 104 mg/dl  The 10-year ASCVD risk score (Christian DK, et al., 2019) is: 6.6%  CTA Coronary 7/17/24 showed patent coronaries, CAC score of 0, reassuring.   -Moderately elevated LDL.  ASCVD risk score low intermediate. Lipoprotein(a) slightly elevated.  -Discussed initiating statin. She will continue  efforts at lifestyle modification for the time being.      No orders of the defined types were placed in this encounter.      There are no discontinued medications.    Orders Placed This Encounter   Procedures    Lipid panel     Standing Status:   Future     Number of Occurrences:   1     Standing Expiration Date:   2/10/2026     Order Specific Question:   Release to patient     Answer:   Immediate     Order Specific Question:   Release to patient     Answer:   Immediate [1]    [facility] MUSE ECG 12 lead (clinic performed)     Scheduling Instructions:      PLEASE USE THIS ORDER FOR ECG'S PERFORMED IN PHYSICIAN OFFICES     Order Specific Question:   Release to patient     Answer:   Immediate [1]       Follow Up Plans:  No follow-ups on file.              ISkyla, am scribing for, and in the presence of, Beena Smiley.    I, Beena Smiley, personally performed the services described in this documentation as scribed by Skyla Smith in my presence, and it is both accurate and complete.       Beena Smiley MD  2/10/2025

## 2025-02-10 NOTE — ASSESSMENT & PLAN NOTE
1/9/24 FLP: , TRG 95, HDL 91,  (no medication).  12/6/24 FLP: , TG 93, HDL 48, . LP(a) 55.  No known FH of premature ASCVD- father passed 69 from stroke vs heart attack.   ApoB 104 mg/dl  The 10-year ASCVD risk score (Christian MCNAMARA, et al., 2019) is: 6.6%  CTA Coronary 7/17/24 showed patent coronaries, CAC score of 0, reassuring.   -Moderately elevated LDL.  ASCVD risk score low intermediate. Lipoprotein(a) slightly elevated.  -Discussed initiating statin. She will continue efforts at lifestyle modification for the time being.

## 2025-02-10 NOTE — ASSESSMENT & PLAN NOTE
EKG shows sinus, right axis deviation, atypical LBBB. EKG today unchanged except for left axis deviation.  Patient has good exercise capacity and denies symptoms. .  -Echo showed 7/15/24 EF 50-55%, abnormal septal motion c/w LBBB, G1DD.   -CTA Coronary 7/17/24 showed patent coronaries, small PFO, CAC score of 0.   -No further work up indicated at this time.

## 2025-03-10 ENCOUNTER — APPOINTMENT (OUTPATIENT)
Dept: URBAN - METROPOLITAN AREA CLINIC 203 | Age: 65
Setting detail: DERMATOLOGY
End: 2025-03-12

## 2025-03-10 DIAGNOSIS — L81.4 OTHER MELANIN HYPERPIGMENTATION: ICD-10-CM

## 2025-03-10 DIAGNOSIS — Z85.828 PERSONAL HISTORY OF OTHER MALIGNANT NEOPLASM OF SKIN: ICD-10-CM

## 2025-03-10 DIAGNOSIS — D22 MELANOCYTIC NEVI: ICD-10-CM

## 2025-03-10 DIAGNOSIS — L57.8 OTHER SKIN CHANGES DUE TO CHRONIC EXPOSURE TO NONIONIZING RADIATION: ICD-10-CM

## 2025-03-10 DIAGNOSIS — L98.8 OTHER SPECIFIED DISORDERS OF THE SKIN AND SUBCUTANEOUS TISSUE: ICD-10-CM

## 2025-03-10 DIAGNOSIS — L82.1 OTHER SEBORRHEIC KERATOSIS: ICD-10-CM

## 2025-03-10 DIAGNOSIS — D18.0 HEMANGIOMA: ICD-10-CM

## 2025-03-10 PROBLEM — D22.71 MELANOCYTIC NEVI OF RIGHT LOWER LIMB, INCLUDING HIP: Status: ACTIVE | Noted: 2025-03-10

## 2025-03-10 PROBLEM — D22.62 MELANOCYTIC NEVI OF LEFT UPPER LIMB, INCLUDING SHOULDER: Status: ACTIVE | Noted: 2025-03-10

## 2025-03-10 PROBLEM — D22.72 MELANOCYTIC NEVI OF LEFT LOWER LIMB, INCLUDING HIP: Status: ACTIVE | Noted: 2025-03-10

## 2025-03-10 PROBLEM — D18.01 HEMANGIOMA OF SKIN AND SUBCUTANEOUS TISSUE: Status: ACTIVE | Noted: 2025-03-10

## 2025-03-10 PROBLEM — D22.61 MELANOCYTIC NEVI OF RIGHT UPPER LIMB, INCLUDING SHOULDER: Status: ACTIVE | Noted: 2025-03-10

## 2025-03-10 PROBLEM — D23.5 OTHER BENIGN NEOPLASM OF SKIN OF TRUNK: Status: ACTIVE | Noted: 2025-03-10

## 2025-03-10 PROBLEM — D22.5 MELANOCYTIC NEVI OF TRUNK: Status: ACTIVE | Noted: 2025-03-10

## 2025-03-10 PROCEDURE — OTHER REASSURANCE: OTHER

## 2025-03-10 PROCEDURE — OTHER COUNSELING: OTHER

## 2025-03-10 PROCEDURE — OTHER SUNSCREEN RECOMMENDATIONS: OTHER

## 2025-03-10 PROCEDURE — OTHER JEUVEAU (U OR CC): OTHER

## 2025-03-10 PROCEDURE — 99213 OFFICE O/P EST LOW 20 MIN: CPT

## 2025-03-10 ASSESSMENT — LOCATION DETAILED DESCRIPTION DERM
LOCATION DETAILED: LEFT MEDIAL BREAST 11-12:00 REGION
LOCATION DETAILED: RIGHT SUPERIOR FOREHEAD
LOCATION DETAILED: LEFT MEDIAL BREAST 10-11:00 REGION
LOCATION DETAILED: LEFT LATERAL EYEBROW
LOCATION DETAILED: LEFT DISTAL POSTERIOR UPPER ARM
LOCATION DETAILED: LEFT MEDIAL EYEBROW
LOCATION DETAILED: RIGHT MEDIAL EYEBROW
LOCATION DETAILED: LEFT INFERIOR FOREHEAD
LOCATION DETAILED: LEFT LATERAL ABDOMEN
LOCATION DETAILED: RIGHT LATERAL EYEBROW
LOCATION DETAILED: EPIGASTRIC SKIN
LOCATION DETAILED: LEFT MID-UPPER BACK
LOCATION DETAILED: RIGHT POSTERIOR SHOULDER
LOCATION DETAILED: LEFT SUPERIOR FOREHEAD
LOCATION DETAILED: RIGHT DISTAL POSTERIOR UPPER ARM
LOCATION DETAILED: LEFT MEDIAL FOREHEAD
LOCATION DETAILED: RIGHT MEDIAL UPPER BACK
LOCATION DETAILED: LEFT PROXIMAL POSTERIOR THIGH
LOCATION DETAILED: LEFT DISTAL POSTERIOR THIGH
LOCATION DETAILED: RIGHT INFERIOR FOREHEAD
LOCATION DETAILED: LEFT LATERAL FOREHEAD
LOCATION DETAILED: RIGHT DISTAL POSTERIOR THIGH
LOCATION DETAILED: RIGHT LATERAL FOREHEAD
LOCATION DETAILED: LEFT POSTERIOR SHOULDER
LOCATION DETAILED: RIGHT PROXIMAL POSTERIOR THIGH

## 2025-03-10 ASSESSMENT — LOCATION SIMPLE DESCRIPTION DERM
LOCATION SIMPLE: ABDOMEN
LOCATION SIMPLE: RIGHT FOREHEAD
LOCATION SIMPLE: LEFT POSTERIOR THIGH
LOCATION SIMPLE: RIGHT POSTERIOR THIGH
LOCATION SIMPLE: RIGHT POSTERIOR UPPER ARM
LOCATION SIMPLE: RIGHT EYEBROW
LOCATION SIMPLE: LEFT FOREHEAD
LOCATION SIMPLE: RIGHT SHOULDER
LOCATION SIMPLE: LEFT SHOULDER
LOCATION SIMPLE: LEFT EYEBROW
LOCATION SIMPLE: RIGHT UPPER BACK
LOCATION SIMPLE: LEFT POSTERIOR UPPER ARM
LOCATION SIMPLE: LEFT BREAST
LOCATION SIMPLE: LEFT UPPER BACK

## 2025-03-10 ASSESSMENT — LOCATION ZONE DERM
LOCATION ZONE: FACE
LOCATION ZONE: LEG
LOCATION ZONE: TRUNK
LOCATION ZONE: ARM

## 2025-06-09 ENCOUNTER — APPOINTMENT (OUTPATIENT)
Dept: URBAN - METROPOLITAN AREA CLINIC 203 | Age: 65
Setting detail: DERMATOLOGY
End: 2025-06-10

## 2025-06-09 DIAGNOSIS — L98.8 OTHER SPECIFIED DISORDERS OF THE SKIN AND SUBCUTANEOUS TISSUE: ICD-10-CM

## 2025-06-09 PROCEDURE — OTHER JEUVEAU (U OR CC): OTHER

## 2025-06-09 ASSESSMENT — LOCATION DETAILED DESCRIPTION DERM
LOCATION DETAILED: LEFT INFERIOR FOREHEAD
LOCATION DETAILED: RIGHT LATERAL FOREHEAD
LOCATION DETAILED: RIGHT SUPERIOR FOREHEAD
LOCATION DETAILED: RIGHT MEDIAL EYEBROW
LOCATION DETAILED: LEFT MEDIAL EYEBROW
LOCATION DETAILED: LEFT LATERAL EYEBROW
LOCATION DETAILED: LEFT LATERAL FOREHEAD
LOCATION DETAILED: RIGHT INFERIOR FOREHEAD
LOCATION DETAILED: LEFT SUPERIOR FOREHEAD
LOCATION DETAILED: RIGHT LATERAL EYEBROW

## 2025-06-09 ASSESSMENT — LOCATION SIMPLE DESCRIPTION DERM
LOCATION SIMPLE: LEFT FOREHEAD
LOCATION SIMPLE: RIGHT EYEBROW
LOCATION SIMPLE: RIGHT FOREHEAD
LOCATION SIMPLE: LEFT EYEBROW

## 2025-06-09 ASSESSMENT — LOCATION ZONE DERM: LOCATION ZONE: FACE

## 2025-07-21 ENCOUNTER — RESULTS FOLLOW-UP (OUTPATIENT)
Dept: PRIMARY CARE | Facility: CLINIC | Age: 65
End: 2025-07-21
Payer: COMMERCIAL

## 2025-07-21 DIAGNOSIS — D50.9 IRON DEFICIENCY ANEMIA, UNSPECIFIED IRON DEFICIENCY ANEMIA TYPE: Primary | ICD-10-CM

## 2025-07-29 ENCOUNTER — TELEMEDICINE (OUTPATIENT)
Dept: SURGERY | Facility: CLINIC | Age: 65
End: 2025-07-29
Payer: COMMERCIAL

## 2025-07-29 VITALS — BODY MASS INDEX: 24.16 KG/M2 | HEIGHT: 65 IN | WEIGHT: 145 LBS

## 2025-07-29 DIAGNOSIS — Z87.11 HISTORY OF PEPTIC ULCER DISEASE: ICD-10-CM

## 2025-07-29 DIAGNOSIS — D50.9 IRON DEFICIENCY ANEMIA, UNSPECIFIED IRON DEFICIENCY ANEMIA TYPE: Primary | ICD-10-CM

## 2025-07-29 PROCEDURE — 3008F BODY MASS INDEX DOCD: CPT | Mod: 95 | Performed by: SURGERY

## 2025-07-29 PROCEDURE — 99212 OFFICE O/P EST SF 10 MIN: CPT | Mod: 95 | Performed by: SURGERY

## 2025-07-29 RX ORDER — CYANOCOBALAMIN (VITAMIN B-12) 1000 MCG
45 TABLET, EXTENDED RELEASE ORAL DAILY
COMMUNITY

## 2025-07-29 ASSESSMENT — ENCOUNTER SYMPTOMS
COUGH: 0
PALPITATIONS: 0
HEMATURIA: 0
CHEST TIGHTNESS: 0
BLOOD IN STOOL: 0
APPETITE CHANGE: 0
SHORTNESS OF BREATH: 0
CONSTIPATION: 0
BRUISES/BLEEDS EASILY: 0
ACTIVITY CHANGE: 0
DIARRHEA: 0
WHEEZING: 0
VOMITING: 0
NAUSEA: 0
ABDOMINAL PAIN: 0
UNEXPECTED WEIGHT CHANGE: 0

## 2025-07-29 NOTE — PROGRESS NOTES
Verification of Patient Location:  The patient affirms they are currently located in the following state: [place]    Request for Consent:    Audio and Video Encounter   Hello, my name is Buddy Pastor MD.  Before we proceed, can you please verify your identification by telling me your full name and date of birth?  Can you tell me who is in the room with you?    You and I are about to have a telemedicine check-in or visit because you have requested it.  This is a live video-conference.  I am a real person, speaking to you in real time.  There is no one else with me on the video-conference. I am not recording this conversation and I am asking you not to record it.  This telemedicine visit will be billed to your health insurance or you, if you are self-insured.  You understand you will be responsible for any copayments or coinsurances that apply to your telemedicine visit.  Communication platform used for this encounter:  [employer] Video Visit (Epic Video Client)       Before starting our telemedicine visit, I am required to get your consent for this virtual check-in or visit by telemedicine. Do you consent?    Patient Response to Request for Consent:  Yes      Patient ID: Francine Tuttle is a 64 y.o. female.  1960      The following have been reviewed and updated as appropriate in this visit:   Tobacco  Allergies  Meds  Problems  Med Hx  Surg Hx  Fam Hx  Soc   Hx      Review of Systems   Constitutional:  Negative for activity change, appetite change and unexpected weight change.   Respiratory:  Negative for cough, chest tightness, shortness of breath and wheezing.    Cardiovascular:  Negative for chest pain and palpitations.   Gastrointestinal:  Negative for abdominal pain, blood in stool, constipation, diarrhea, nausea and vomiting.   Genitourinary:  Negative for hematuria and vaginal bleeding.   Hematological:  Does not bruise/bleed easily.         Time Spent:  I spent 15 minutes on this  date of service performing the following activities: obtaining history.  General Surgery H&P  Patient: Francine Tuttle  MRN: [id number]  1960    Visit Date: 7/29/2025  Encounter Provider: Buddy Pastor  Referring Provider: Ava Auguste DO    Subjective   Consult (Upper Endoscopy/Colonoscopy Low Hemoglobin)      Francine Tuttle is a 64 y.o. female who was seen in consultation at the request of referring physician for management recommendations.  Francine presents to the office with iron deficiency anemia. Francine underwent an upper endoscopy in 2020 for revealing fundic gland polyps and a large hiatal hernia.  She does have a history of peptic ulcer disease in the past.  In 2023 Francine underwent a colonoscopy revealing diverticulosis.   Francine denies family history of colorectal cancer, abdominal pain, unexpected weight loss, change in bowel habits, melena or blood per rectum.  She has no cough, wheezing or shortness of breath and no chest pain on exertion or palpitations.  There is no history of bleeding disorder.  Francine denies hematuria or vaginal bleeding.  She does not bruise easily.    Medical History:   Past Medical History:   Diagnosis Date    Allergic     Breast cancer (CMS/HCC)     right    GERD (gastroesophageal reflux disease)     ulcers    Hypertension     Lipid disorder        Surgical History:   Past Surgical History   Procedure Laterality Date    Brain surgery  05/2017    Breast lumpectomy      Colonoscopy      2012       Social History:   Social History     Social History Narrative        Children    Works FT, events - RETIRED 2023    Walks 5 miles per day.    30-40 mile trail bike rides       Family History:   Family History   Problem Relation Name Age of Onset    Hyperlipidemia Biological Father      Parkinsonism Biological Father      Hyperlipidemia Biological Sister      Heart attack Maternal Grandmother      Heart disease Maternal Grandmother      Colon  cancer Neg Hx         Allergies: Latex and Sulfasalazine    Current Medications:  Current Outpatient Medications   Medication Sig Dispense Refill    iron, carbonyl (FEOSOL) 45 mg tablet Take 45 mg by mouth daily.      lisinopriL-hydrochlorothiazide (PRINZIDE,ZESTORETIC) 10-12.5 mg per tablet Take 0.5 tablets by mouth daily. 90 tablet 3    pantoprazole (PROTONIX) 40 mg EC tablet TAKE 1 TABLET TWICE A DAY (Patient taking differently: Take 40 mg by mouth nightly.) 180 tablet 3    bimatoprost (LATISSE) 0.03 % ophthalmic solution APPLY 1 GTT TO EACH EYELID Q NIGHT  3    mometasone (ELOCON) 0.1 % cream Apply topically daily. 45 g 6     No current facility-administered medications for this visit.       Objective     Labs  Lab Results   Component Value Date    WBC 5.5 07/18/2025    HGB 11.1 (L) 07/18/2025    HCT 37.3 07/18/2025    MCV 76.3 (L) 07/18/2025     (H) 07/18/2025       Lab Results   Component Value Date    GLUCOSE 94 12/03/2024    CALCIUM 9.6 12/03/2024     12/03/2024    K 4.4 12/03/2024    CO2 30 12/03/2024     12/03/2024    BUN 14 12/03/2024    CREATININE 0.64 12/03/2024       Lab Results   Component Value Date    ALT 19 01/09/2024    AST 18 01/09/2024    ALKPHOS 53 01/09/2024    BILITOT 0.4 01/09/2024         Imaging  Not applicable    Assessment       Iron deficiency anemia  History of peptic ulcer disease  Hiatal hernia  Fundic gland polyps  Diverticulosis          Plan   Upper endoscopy and colonoscopy. Francine expressed understanding of the indications for the procedure, the procedure which is planned, the alternatives, including doing nothing, and the risks and complications of each.  I did answer all of her questions to her satisfaction.      Buddy Pastor MD

## 2025-07-30 DIAGNOSIS — I10 ESSENTIAL HYPERTENSION: Chronic | ICD-10-CM

## 2025-07-30 RX ORDER — LISINOPRIL AND HYDROCHLOROTHIAZIDE 10; 12.5 MG/1; MG/1
0.5 TABLET ORAL DAILY
Qty: 45 TABLET | Refills: 1 | Status: SHIPPED | OUTPATIENT
Start: 2025-07-30

## 2025-08-07 ENCOUNTER — DOCUMENTATION (OUTPATIENT)
Dept: SURGERY | Facility: CLINIC | Age: 65
End: 2025-08-07
Payer: COMMERCIAL

## 2025-08-14 ENCOUNTER — LAB REQUISITION (OUTPATIENT)
Dept: LAB | Facility: HOSPITAL | Age: 65
End: 2025-08-14
Attending: SURGERY

## 2025-08-14 DIAGNOSIS — Z87.11 PERSONAL HISTORY OF PEPTIC ULCER DISEASE: ICD-10-CM

## 2025-08-14 DIAGNOSIS — D50.9 IRON DEFICIENCY ANEMIA, UNSPECIFIED: ICD-10-CM

## 2025-08-14 PROCEDURE — 45378 DIAGNOSTIC COLONOSCOPY: CPT | Performed by: SURGERY

## 2025-08-14 PROCEDURE — 88305 TISSUE EXAM BY PATHOLOGIST: CPT | Performed by: SURGERY

## 2025-08-14 PROCEDURE — 43239 EGD BIOPSY SINGLE/MULTIPLE: CPT | Performed by: SURGERY

## 2025-08-18 LAB
CASE RPRT: NORMAL
CHOLEST SERPL-MCNC: 273 MG/DL
CHOLEST/HDLC SERPL: 3 (CALC)
HDLC SERPL-MCNC: 90 MG/DL
LDLC SERPL CALC-MCNC: 160 MG/DL (CALC)
NONHDLC SERPL-MCNC: 183 MG/DL (CALC)
PATH REPORT.FINAL DX SPEC: NORMAL
PATH REPORT.GROSS SPEC: NORMAL
TRIGL SERPL-MCNC: 114 MG/DL

## 2025-08-20 ENCOUNTER — OFFICE VISIT (OUTPATIENT)
Dept: CARDIOLOGY | Facility: CLINIC | Age: 65
End: 2025-08-20
Payer: COMMERCIAL

## 2025-08-20 VITALS
BODY MASS INDEX: 25.33 KG/M2 | DIASTOLIC BLOOD PRESSURE: 88 MMHG | WEIGHT: 152 LBS | OXYGEN SATURATION: 96 % | SYSTOLIC BLOOD PRESSURE: 118 MMHG | HEART RATE: 73 BPM | HEIGHT: 65 IN

## 2025-08-20 DIAGNOSIS — I10 ESSENTIAL HYPERTENSION: ICD-10-CM

## 2025-08-20 DIAGNOSIS — E78.2 MIXED HYPERLIPIDEMIA: ICD-10-CM

## 2025-08-20 DIAGNOSIS — R94.31 ABNORMAL EKG: Primary | ICD-10-CM

## 2025-08-20 LAB
ATRIAL RATE: 65
P AXIS: 73
PR INTERVAL: 156
QRS DURATION: 120
QT INTERVAL: 426
QTC CALCULATION(BAZETT): 443
R AXIS: 107
T WAVE AXIS: 19
VENTRICULAR RATE: 65

## 2025-08-20 PROCEDURE — 93000 ELECTROCARDIOGRAM COMPLETE: CPT | Performed by: INTERNAL MEDICINE

## 2025-08-20 PROCEDURE — 3008F BODY MASS INDEX DOCD: CPT | Performed by: INTERNAL MEDICINE

## 2025-08-20 PROCEDURE — 99214 OFFICE O/P EST MOD 30 MIN: CPT | Performed by: INTERNAL MEDICINE

## 2025-08-20 RX ORDER — ROSUVASTATIN CALCIUM 5 MG/1
5 TABLET, COATED ORAL EVERY EVENING
Qty: 90 TABLET | Refills: 3 | Status: SHIPPED | OUTPATIENT
Start: 2025-08-20

## 2025-08-20 RX ORDER — ASCORBIC ACID 500 MG
500 TABLET ORAL DAILY
COMMUNITY

## 2025-08-20 ASSESSMENT — ENCOUNTER SYMPTOMS
LIGHT-HEADEDNESS: 0
DYSPNEA ON EXERTION: 0
DIZZINESS: 0
PALPITATIONS: 0

## 2025-09-06 LAB
BASOPHILS # BLD AUTO: 38 CELLS/UL (ref 0–200)
BASOPHILS NFR BLD AUTO: 0.6 %
EOSINOPHIL # BLD AUTO: 120 CELLS/UL (ref 15–500)
EOSINOPHIL NFR BLD AUTO: 1.9 %
ERYTHROCYTE [DISTWIDTH] IN BLOOD BY AUTOMATED COUNT: 22.5 % (ref 11–15)
HCT VFR BLD AUTO: 43.3 % (ref 35–45)
HGB BLD-MCNC: 13.1 G/DL (ref 11.7–15.5)
LYMPHOCYTES # BLD AUTO: 1859 CELLS/UL (ref 850–3900)
LYMPHOCYTES NFR BLD AUTO: 29.5 %
MCH RBC QN AUTO: 25.4 PG (ref 27–33)
MCHC RBC AUTO-ENTMCNC: 30.3 G/DL (ref 32–36)
MCV RBC AUTO: 83.9 FL (ref 80–100)
MONOCYTES # BLD AUTO: 617 CELLS/UL (ref 200–950)
MONOCYTES NFR BLD AUTO: 9.8 %
MORPHOLOGY BLD-IMP: ABNORMAL
NEUTROPHILS # BLD AUTO: 3667 CELLS/UL (ref 1500–7800)
NEUTROPHILS NFR BLD AUTO: 58.2 %
PLATELET # BLD AUTO: 323 THOUSAND/UL (ref 140–400)
PMV BLD REES-ECKER: 8.9 FL (ref 7.5–12.5)
RBC # BLD AUTO: 5.16 MILLION/UL (ref 3.8–5.1)
WBC # BLD AUTO: 6.3 THOUSAND/UL (ref 3.8–10.8)